# Patient Record
Sex: FEMALE | Race: WHITE | Employment: PART TIME | URBAN - METROPOLITAN AREA
[De-identification: names, ages, dates, MRNs, and addresses within clinical notes are randomized per-mention and may not be internally consistent; named-entity substitution may affect disease eponyms.]

---

## 2017-02-12 ENCOUNTER — PATIENT MESSAGE (OUTPATIENT)
Dept: FAMILY MEDICINE CLINIC | Facility: CLINIC | Age: 57
End: 2017-02-12

## 2017-04-24 ENCOUNTER — OFFICE VISIT (OUTPATIENT)
Dept: FAMILY MEDICINE CLINIC | Facility: CLINIC | Age: 57
End: 2017-04-24

## 2017-04-24 VITALS
HEIGHT: 58.5 IN | HEART RATE: 87 BPM | WEIGHT: 162.63 LBS | DIASTOLIC BLOOD PRESSURE: 84 MMHG | OXYGEN SATURATION: 98 % | TEMPERATURE: 99 F | BODY MASS INDEX: 33.23 KG/M2 | RESPIRATION RATE: 16 BRPM | SYSTOLIC BLOOD PRESSURE: 116 MMHG

## 2017-04-24 DIAGNOSIS — Z13.29 SCREENING FOR THYROID DISORDER: ICD-10-CM

## 2017-04-24 DIAGNOSIS — Z13.220 SCREENING, LIPID: ICD-10-CM

## 2017-04-24 DIAGNOSIS — I10 ESSENTIAL HYPERTENSION: Primary | ICD-10-CM

## 2017-04-24 DIAGNOSIS — Z12.31 ENCOUNTER FOR SCREENING MAMMOGRAM FOR BREAST CANCER: ICD-10-CM

## 2017-04-24 DIAGNOSIS — R63.5 WEIGHT GAIN: ICD-10-CM

## 2017-04-24 DIAGNOSIS — D72.819 LEUKOPENIA, UNSPECIFIED TYPE: ICD-10-CM

## 2017-04-24 DIAGNOSIS — Z13.0 SCREENING FOR DEFICIENCY ANEMIA: ICD-10-CM

## 2017-04-24 DIAGNOSIS — Z13.1 SCREENING FOR DIABETES MELLITUS: ICD-10-CM

## 2017-04-24 PROCEDURE — 99213 OFFICE O/P EST LOW 20 MIN: CPT | Performed by: FAMILY MEDICINE

## 2017-04-24 RX ORDER — TRIAMTERENE AND HYDROCHLOROTHIAZIDE 75; 50 MG/1; MG/1
TABLET ORAL
Qty: 45 TABLET | Refills: 1 | Status: CANCELLED | OUTPATIENT
Start: 2017-04-24

## 2017-04-24 RX ORDER — IRBESARTAN 150 MG/1
150 TABLET ORAL NIGHTLY
Qty: 30 TABLET | Refills: 1 | Status: SHIPPED | OUTPATIENT
Start: 2017-04-24 | End: 2017-07-11

## 2017-04-24 NOTE — PATIENT INSTRUCTIONS
STop the triamterene/HCTZ. Start irbesartan 150 mg daily, goal BP under 140/90. recheck blood pressure and physical in about a month, after labs.

## 2017-04-24 NOTE — PROGRESS NOTES
Nancy Donovan IS A 64year old female [facility] Patient presents with:  Medication Request: BP med refill  annual Px rescheduled        History of present illness:     BP occasionally has been higher when donating blood, no anemia, donates q 2 months. grandmother   • Hypertension Father    • Hypertension Other      grandmother   • Diabetes Other      grandmother, great aunt   • Asthma Father    • Asthma       grandmother   • Alcohol and Other Disorders Associated Mother    • Psychiatric Other for medication request.    Diagnoses and all orders for this visit:    Essential hypertension  -     Comp Metabolic Panel (14) [E]; Future  -     Irbesartan 150 MG Oral Tab; Take 1 tablet (150 mg total) by mouth nightly. Weight gain  -     TSH [E];  Futu

## 2017-05-02 ENCOUNTER — HOSPITAL ENCOUNTER (OUTPATIENT)
Dept: MAMMOGRAPHY | Age: 57
Discharge: HOME | End: 2017-05-02
Attending: FAMILY MEDICINE
Payer: COMMERCIAL

## 2017-05-02 DIAGNOSIS — Z12.31 ENCOUNTER FOR SCREENING MAMMOGRAM FOR BREAST CANCER: ICD-10-CM

## 2017-05-02 PROCEDURE — 77067 SCR MAMMO BI INCL CAD: CPT

## 2017-05-06 ENCOUNTER — LAB ENCOUNTER (OUTPATIENT)
Dept: LAB | Facility: HOSPITAL | Age: 57
End: 2017-05-06
Payer: COMMERCIAL

## 2017-05-06 DIAGNOSIS — D72.819 LEUKOPENIA, UNSPECIFIED TYPE: ICD-10-CM

## 2017-05-06 DIAGNOSIS — I10 ESSENTIAL HYPERTENSION: ICD-10-CM

## 2017-05-06 DIAGNOSIS — Z13.0 SCREENING FOR DEFICIENCY ANEMIA: ICD-10-CM

## 2017-05-06 DIAGNOSIS — Z13.29 SCREENING FOR THYROID DISORDER: ICD-10-CM

## 2017-05-06 DIAGNOSIS — R63.5 WEIGHT GAIN: ICD-10-CM

## 2017-05-06 DIAGNOSIS — Z13.1 SCREENING FOR DIABETES MELLITUS: ICD-10-CM

## 2017-05-06 DIAGNOSIS — Z13.220 SCREENING, LIPID: ICD-10-CM

## 2017-05-06 PROCEDURE — 85025 COMPLETE CBC W/AUTO DIFF WBC: CPT

## 2017-05-06 PROCEDURE — 80053 COMPREHEN METABOLIC PANEL: CPT

## 2017-05-06 PROCEDURE — 84443 ASSAY THYROID STIM HORMONE: CPT

## 2017-05-06 PROCEDURE — 36415 COLL VENOUS BLD VENIPUNCTURE: CPT

## 2017-05-06 PROCEDURE — 80061 LIPID PANEL: CPT

## 2017-05-06 PROCEDURE — 83036 HEMOGLOBIN GLYCOSYLATED A1C: CPT

## 2017-07-11 DIAGNOSIS — I10 ESSENTIAL HYPERTENSION: ICD-10-CM

## 2017-07-13 RX ORDER — IRBESARTAN 150 MG/1
TABLET ORAL
Qty: 30 TABLET | Refills: 0 | Status: SHIPPED | OUTPATIENT
Start: 2017-07-13 | End: 2017-09-03

## 2017-07-13 NOTE — TELEPHONE ENCOUNTER
Future Appointments  Date Time Provider Moi Fine   8/31/2017 1:30 PM Laura Henley MD [facility] [address]   12/4/2017 1:30 PM Radha Glass MD [facility]       LOV 4/17 Start irbesartan 150 mg daily, goal BP under 140/90.   recheck b

## 2017-08-31 ENCOUNTER — OFFICE VISIT (OUTPATIENT)
Dept: FAMILY MEDICINE CLINIC | Facility: CLINIC | Age: 57
End: 2017-08-31

## 2017-08-31 VITALS
RESPIRATION RATE: 16 BRPM | HEART RATE: 70 BPM | TEMPERATURE: 99 F | BODY MASS INDEX: 32.62 KG/M2 | WEIGHT: 159.63 LBS | SYSTOLIC BLOOD PRESSURE: 124 MMHG | DIASTOLIC BLOOD PRESSURE: 80 MMHG | HEIGHT: 58.5 IN

## 2017-08-31 DIAGNOSIS — Z12.11 SCREEN FOR COLON CANCER: Primary | ICD-10-CM

## 2017-08-31 DIAGNOSIS — M85.89 OSTEOPENIA OF MULTIPLE SITES: ICD-10-CM

## 2017-08-31 PROCEDURE — 99396 PREV VISIT EST AGE 40-64: CPT | Performed by: FAMILY MEDICINE

## 2017-08-31 RX ORDER — PHENOL 1.4 %
1 AEROSOL, SPRAY (ML) MUCOUS MEMBRANE DAILY
COMMUNITY

## 2017-08-31 NOTE — PATIENT INSTRUCTIONS
Pap recommended in 2019. We recommend exercise 150 minutes per week, combination of aerobic, strength and flexibility.  Diet: recommend low fat, high fiber with whole grains, fruits, vegetables, lean meats, chicken, fish, low-fat dairy if tolerated, limit s

## 2017-08-31 NOTE — PROGRESS NOTES
Patricio Serrano is a 64year old female here for Patient presents with:  Physical: Room 4. Unsure of pap       HPI:   Patient complains of here for well exam.    Older 2 grandchildren are senior & sophomore in [employer], youngest in 8th grade.      Some word-findi Rfl:    Calcium Carbonate Antacid (TUMS OR) Take 1 tablet by mouth daily. Disp:  Rfl:    IRBESARTAN 150 MG Oral Tab TAKE 1 TABLET(150 MG) BY MOUTH EVERY NIGHT Disp: 30 tablet Rfl: 0   GLUCOS-CHONDROIT-COLLAG-HYAL OR Take  by mouth.  Disp:  Rfl:        Jermain myalgias. Eye: No change in vision, no discharge, itching or dryness. ENT: No earache or change in hearing. No nasal congestion, allergies, sinus pain, nosebleed or sore throat.    Heme/lymph: No unusual bleeding or bruising, no lymph node enlargement or limits  EXTREMITIES: no cyanosis, clubbing or edema. Peripheral pulses normal.  NEURO: Nonfocal exam. Oriented times three,cranial nerves are intact,motor and sensory are grossly intact, speech normal, DTR's equal bilaterally.     Recent/past labs and consu MCHC 05/06/2017 32.8  31.0 - 37.0 g/dL Final   • RDW 05/06/2017 12.6  11.5 - 16.0 % Final   • RDW-SD 05/06/2017 42.4  35.1 - 46.3 fL Final   • Neutrophil Absolute Prelim 05/06/2017 1.86  1.30 - 6.70 x10 (3) uL Final   • Neutrophil Absolute 05/06/2017 1.86 return for CPX in 1 year.

## 2017-09-03 DIAGNOSIS — I10 ESSENTIAL HYPERTENSION: ICD-10-CM

## 2017-09-05 RX ORDER — IRBESARTAN 150 MG/1
TABLET ORAL
Qty: 90 TABLET | Refills: 1 | Status: SHIPPED | OUTPATIENT
Start: 2017-09-05 | End: 2017-11-20

## 2017-09-05 NOTE — TELEPHONE ENCOUNTER
Hypertension Medications Protocol Passed9/3 10:47 PM   CMP or BMP in past 12 months    Last serum creatinine< 2.0    Appointment in past 6 or next 3 months     Last refill 7/11/17 #30 only  First refill 4/24/17 #30 +1 refill  Patient Instructions   STop th

## 2017-09-09 ENCOUNTER — HOSPITAL ENCOUNTER (OUTPATIENT)
Dept: BONE DENSITY | Age: 57
Discharge: HOME | End: 2017-09-09
Attending: FAMILY MEDICINE
Payer: COMMERCIAL

## 2017-09-09 DIAGNOSIS — M85.89 OSTEOPENIA OF MULTIPLE SITES: ICD-10-CM

## 2017-09-09 PROCEDURE — 77080 DXA BONE DENSITY AXIAL: CPT | Performed by: FAMILY MEDICINE

## 2017-09-12 ENCOUNTER — APPOINTMENT (OUTPATIENT)
Dept: LAB | Facility: HOSPITAL | Age: 57
End: 2017-09-12
Attending: FAMILY MEDICINE
Payer: COMMERCIAL

## 2017-09-12 DIAGNOSIS — Z12.11 SCREEN FOR COLON CANCER: ICD-10-CM

## 2017-09-12 PROCEDURE — 82272 OCCULT BLD FECES 1-3 TESTS: CPT

## 2017-10-10 ENCOUNTER — IMMUNIZATION (OUTPATIENT)
Dept: FAMILY MEDICINE CLINIC | Facility: CLINIC | Age: 57
End: 2017-10-10

## 2017-10-10 DIAGNOSIS — Z23 NEED FOR VACCINATION: ICD-10-CM

## 2017-10-10 PROCEDURE — 90471 IMMUNIZATION ADMIN: CPT | Performed by: FAMILY MEDICINE

## 2017-10-10 PROCEDURE — 90686 IIV4 VACC NO PRSV 0.5 ML IM: CPT | Performed by: FAMILY MEDICINE

## 2017-11-20 ENCOUNTER — OFFICE VISIT (OUTPATIENT)
Dept: FAMILY MEDICINE CLINIC | Facility: CLINIC | Age: 57
End: 2017-11-20

## 2017-11-20 VITALS
HEIGHT: 58.5 IN | BODY MASS INDEX: 32.49 KG/M2 | TEMPERATURE: 99 F | HEART RATE: 92 BPM | SYSTOLIC BLOOD PRESSURE: 142 MMHG | WEIGHT: 159 LBS | DIASTOLIC BLOOD PRESSURE: 102 MMHG | OXYGEN SATURATION: 98 % | RESPIRATION RATE: 20 BRPM

## 2017-11-20 DIAGNOSIS — I10 ESSENTIAL HYPERTENSION: ICD-10-CM

## 2017-11-20 DIAGNOSIS — R07.9 CHEST PAIN, UNSPECIFIED TYPE: Primary | ICD-10-CM

## 2017-11-20 PROCEDURE — 99213 OFFICE O/P EST LOW 20 MIN: CPT | Performed by: FAMILY MEDICINE

## 2017-11-20 RX ORDER — IRBESARTAN 150 MG/1
TABLET ORAL
Qty: 90 TABLET | Refills: 1 | Status: SHIPPED | OUTPATIENT
Start: 2017-11-20 | End: 2018-06-27

## 2017-11-20 RX ORDER — HYDROCHLOROTHIAZIDE 12.5 MG/1
12.5 TABLET ORAL DAILY
Qty: 30 TABLET | Refills: 1 | Status: SHIPPED | OUTPATIENT
Start: 2017-11-20 | End: 2017-12-27

## 2017-11-20 NOTE — PATIENT INSTRUCTIONS
Stress test  Add HCTZ 12.5 mg to irbesartan, take HCTZ in morning and irbesartan at night. Recheck in 3 weeks with DR. Cuenca or Richard Woo.

## 2017-11-25 ENCOUNTER — APPOINTMENT (OUTPATIENT)
Dept: LAB | Facility: HOSPITAL | Age: 57
End: 2017-11-25
Attending: FAMILY MEDICINE
Payer: COMMERCIAL

## 2017-11-25 DIAGNOSIS — I10 ESSENTIAL HYPERTENSION: ICD-10-CM

## 2017-11-25 PROCEDURE — 80053 COMPREHEN METABOLIC PANEL: CPT

## 2017-11-25 PROCEDURE — 36415 COLL VENOUS BLD VENIPUNCTURE: CPT

## 2017-12-08 ENCOUNTER — HOSPITAL ENCOUNTER (OUTPATIENT)
Dept: CV DIAGNOSTICS | Facility: HOSPITAL | Age: 57
Discharge: HOME | End: 2017-12-08
Attending: FAMILY MEDICINE
Payer: COMMERCIAL

## 2017-12-08 DIAGNOSIS — I10 ESSENTIAL HYPERTENSION: ICD-10-CM

## 2017-12-08 DIAGNOSIS — R07.9 CHEST PAIN, UNSPECIFIED TYPE: ICD-10-CM

## 2017-12-08 PROCEDURE — 93350 STRESS TTE ONLY: CPT | Performed by: FAMILY MEDICINE

## 2017-12-08 PROCEDURE — 93018 CV STRESS TEST I&R ONLY: CPT | Performed by: FAMILY MEDICINE

## 2017-12-08 PROCEDURE — 93017 CV STRESS TEST TRACING ONLY: CPT | Performed by: FAMILY MEDICINE

## 2017-12-27 ENCOUNTER — OFFICE VISIT (OUTPATIENT)
Dept: FAMILY MEDICINE CLINIC | Facility: CLINIC | Age: 57
End: 2017-12-27

## 2017-12-27 VITALS
HEIGHT: 58.4 IN | OXYGEN SATURATION: 97 % | HEART RATE: 60 BPM | DIASTOLIC BLOOD PRESSURE: 82 MMHG | SYSTOLIC BLOOD PRESSURE: 122 MMHG | WEIGHT: 154.25 LBS | TEMPERATURE: 99 F | BODY MASS INDEX: 31.94 KG/M2 | RESPIRATION RATE: 16 BRPM

## 2017-12-27 DIAGNOSIS — Z11.59 NEED FOR HEPATITIS C SCREENING TEST: ICD-10-CM

## 2017-12-27 DIAGNOSIS — I10 ESSENTIAL HYPERTENSION: Primary | ICD-10-CM

## 2017-12-27 DIAGNOSIS — Z51.81 THERAPEUTIC DRUG MONITORING: ICD-10-CM

## 2017-12-27 PROCEDURE — 99214 OFFICE O/P EST MOD 30 MIN: CPT | Performed by: FAMILY MEDICINE

## 2017-12-27 RX ORDER — HYDROCHLOROTHIAZIDE 12.5 MG/1
12.5 TABLET ORAL DAILY
Qty: 90 TABLET | Refills: 1 | Status: SHIPPED | OUTPATIENT
Start: 2017-12-27 | End: 2018-02-06

## 2017-12-27 NOTE — PATIENT INSTRUCTIONS
Discharge Instructions for High Blood Pressure (Hypertension)  You have been diagnosed with high blood pressure (also called hypertension). This means the force of blood against your artery walls is too strong.  It also means your heart is working hard to · Begin an exercise program. Ask your doctor how to get started.  The American Heart Association recommends aerobic exercise 3 to 4 times a week for an average of 40 minutes at a time, with your doctor's approval. Simple activities like walking or gardening

## 2017-12-27 NOTE — PROGRESS NOTES
HPI:   Zeeshan Chang is a 62year old female that presents for hypertension follow-up. She takes irbesartan 150 mg daily. Hydrochlorothiazide was recently added to her regimen last month.   Blood pressure has been well controlled at home since then and rhythm, no murmurs, rubs or gallops. LUNGS: Clear to auscultation bilterally, no rales/rhonchi/wheezing. ABDOMEN:  Soft, nondistended, nontender, bowel sounds normal in all 4 quadrants, no masses, no hepatosplenomegaly.   EXTREMITIES:  No edema, no cyanos

## 2018-01-03 ENCOUNTER — TELEPHONE (OUTPATIENT)
Dept: FAMILY MEDICINE CLINIC | Facility: CLINIC | Age: 58
End: 2018-01-03

## 2018-01-03 ENCOUNTER — LAB ENCOUNTER (OUTPATIENT)
Dept: LAB | Facility: HOSPITAL | Age: 58
End: 2018-01-03
Attending: FAMILY MEDICINE
Payer: COMMERCIAL

## 2018-01-03 DIAGNOSIS — Z51.81 THERAPEUTIC DRUG MONITORING: ICD-10-CM

## 2018-01-03 DIAGNOSIS — T50.2X5A DIURETIC-INDUCED HYPOKALEMIA: Primary | ICD-10-CM

## 2018-01-03 DIAGNOSIS — E87.6 DIURETIC-INDUCED HYPOKALEMIA: Primary | ICD-10-CM

## 2018-01-03 DIAGNOSIS — I10 ESSENTIAL HYPERTENSION: ICD-10-CM

## 2018-01-03 DIAGNOSIS — Z11.59 NEED FOR HEPATITIS C SCREENING TEST: ICD-10-CM

## 2018-01-03 LAB
BUN BLD-MCNC: 11 MG/DL (ref 8–20)
CALCIUM BLD-MCNC: 9.6 MG/DL (ref 8.3–10.3)
CHLORIDE: 103 MMOL/L (ref 101–111)
CO2: 31 MMOL/L (ref 22–32)
CREAT BLD-MCNC: 0.69 MG/DL (ref 0.55–1.02)
GLUCOSE BLD-MCNC: 83 MG/DL (ref 70–99)
HEPATITIS C VIRUS AB INTERPRETATION: NONREACTIVE
POTASSIUM SERPL-SCNC: 3.4 MMOL/L (ref 3.6–5.1)
SODIUM SERPL-SCNC: 140 MMOL/L (ref 136–144)

## 2018-01-03 PROCEDURE — 36415 COLL VENOUS BLD VENIPUNCTURE: CPT

## 2018-01-03 PROCEDURE — 80048 BASIC METABOLIC PNL TOTAL CA: CPT

## 2018-01-03 PROCEDURE — 86803 HEPATITIS C AB TEST: CPT

## 2018-01-03 NOTE — TELEPHONE ENCOUNTER
----- Message from Bertha Odom DO sent at 1/3/2018  1:06 PM CST -----  Hypokalemia likely 2/2 new HCTZ, eat banana daily, recheck BMP in 2-4 weeks. If persists, would changes meds. Spoke to patient and gave information. Lab ordered.

## 2018-01-28 DIAGNOSIS — I10 ESSENTIAL HYPERTENSION: ICD-10-CM

## 2018-01-29 RX ORDER — HYDROCHLOROTHIAZIDE 12.5 MG/1
TABLET ORAL
Qty: 30 TABLET | Refills: 0 | OUTPATIENT
Start: 2018-01-29

## 2018-01-29 NOTE — TELEPHONE ENCOUNTER
Future Appointments  Date Time Provider Moi Rody   6/22/2018 9:30 AM Lani Moeller MD [facility]       LOV    LAST LAB    LAST RX  hydrochlorothiazide 12.5 MG Oral Tab 90 tablet 1 12/27/2017         PROTOCOL  Hypertension Medications

## 2018-01-31 ENCOUNTER — PATIENT MESSAGE (OUTPATIENT)
Dept: FAMILY MEDICINE CLINIC | Facility: CLINIC | Age: 58
End: 2018-01-31

## 2018-01-31 NOTE — TELEPHONE ENCOUNTER
From: Sarina Juares  To: Mariela Pemberton MD  Sent: 1/31/2018 10:40 AM CST  Subject: Prescription Question    Prescription refill was denied. Please advise. Praying you are well and had a restful, enjoyable recovery period. Hugs!     Holly Resendiz

## 2018-02-05 ENCOUNTER — PATIENT MESSAGE (OUTPATIENT)
Dept: FAMILY MEDICINE CLINIC | Facility: CLINIC | Age: 58
End: 2018-02-05

## 2018-02-05 DIAGNOSIS — I10 ESSENTIAL HYPERTENSION: ICD-10-CM

## 2018-02-06 RX ORDER — HYDROCHLOROTHIAZIDE 12.5 MG/1
12.5 TABLET ORAL DAILY
Qty: 90 TABLET | Refills: 1 | Status: SHIPPED | OUTPATIENT
Start: 2018-02-06 | End: 2018-08-19

## 2018-02-06 NOTE — TELEPHONE ENCOUNTER
From: Luis Jane  To: Jose D Jaffe MD  Sent: 2/5/2018 6:25 PM CST  Subject: Prescription Question    Good afternoon.     I checked with [facility] and instead of receiving a refill from your office, the system coded it as a denial. This seems to b

## 2018-03-15 ENCOUNTER — TELEPHONE (OUTPATIENT)
Dept: FAMILY MEDICINE CLINIC | Facility: CLINIC | Age: 58
End: 2018-03-15

## 2018-03-15 NOTE — TELEPHONE ENCOUNTER
Left detailed message for pt    Advised to seek UC if symptoms worsen    BMP was reordered by Dr Jordan Robertson due to low potasium last Jan    Notes Recorded by Laureen Fitzgerald DO on 1/3/2018 at 1:06 PM CST  Hypokalemia likely 2/2 new HCTZ, eat banana daily, re

## 2018-03-15 NOTE — TELEPHONE ENCOUNTER
Pt called and stated \"Lu sent her a [employer] message that she is overdue for Labs\". I did not see a TE so unsure how Dank Guzman knew to call the pt. After speaking with Jourdan Choudhary, I see a Lab Reminder letter was sent out.    (Under Letters tab)  Was unsure whe

## 2018-03-17 ENCOUNTER — PATIENT MESSAGE (OUTPATIENT)
Dept: FAMILY MEDICINE CLINIC | Facility: CLINIC | Age: 58
End: 2018-03-17

## 2018-03-17 ENCOUNTER — APPOINTMENT (OUTPATIENT)
Dept: LAB | Facility: HOSPITAL | Age: 58
End: 2018-03-17
Attending: FAMILY MEDICINE
Payer: COMMERCIAL

## 2018-03-17 DIAGNOSIS — T50.2X5A DIURETIC-INDUCED HYPOKALEMIA: ICD-10-CM

## 2018-03-17 DIAGNOSIS — E87.6 DIURETIC-INDUCED HYPOKALEMIA: ICD-10-CM

## 2018-03-17 LAB
BUN BLD-MCNC: 12 MG/DL (ref 8–20)
CALCIUM BLD-MCNC: 9.4 MG/DL (ref 8.3–10.3)
CHLORIDE: 103 MMOL/L (ref 101–111)
CO2: 32 MMOL/L (ref 22–32)
CREAT BLD-MCNC: 0.63 MG/DL (ref 0.55–1.02)
GLUCOSE BLD-MCNC: 85 MG/DL (ref 70–99)
POTASSIUM SERPL-SCNC: 3.7 MMOL/L (ref 3.6–5.1)
SODIUM SERPL-SCNC: 141 MMOL/L (ref 136–144)

## 2018-03-17 PROCEDURE — 80048 BASIC METABOLIC PNL TOTAL CA: CPT

## 2018-03-17 PROCEDURE — 36415 COLL VENOUS BLD VENIPUNCTURE: CPT

## 2018-03-19 NOTE — TELEPHONE ENCOUNTER
From: Krish Raines  To: Vance Palumbo MD  Sent: 3/17/2018 9:02 AM CDT  Subject: Test Results Question    Good morning.    I went to [employer] to retrieve the results from this morning's metabolic panel blood draw and saw a message from yesterday for y

## 2018-03-23 ENCOUNTER — TELEPHONE (OUTPATIENT)
Dept: FAMILY MEDICINE CLINIC | Facility: CLINIC | Age: 58
End: 2018-03-23

## 2018-03-23 NOTE — TELEPHONE ENCOUNTER
Requested an Appt. Abdominal pain x 8 weeks. Not constant. Eating really makes it hurt sometimes, also stretching. Got better, then flared up again. Declined 2:30 appt today. Goes into work at 2:00. OK to come in next week.   Declined suggestion

## 2018-03-24 NOTE — TELEPHONE ENCOUNTER
Spoke to Pt Saturday. Scheduled Appt.   Future Appointments  Date Time Provider Moi Rody   3/26/2018 11:00 AM Adriane Lujan MD [facility]   4/9/2018 5:45 PM Dangelo Garcia MD [facility]   6/22/2018 9:30 AM Dangelo Garcia,

## 2018-03-26 ENCOUNTER — OFFICE VISIT (OUTPATIENT)
Dept: FAMILY MEDICINE CLINIC | Facility: CLINIC | Age: 58
End: 2018-03-26

## 2018-03-26 VITALS
TEMPERATURE: 99 F | HEIGHT: 58.5 IN | WEIGHT: 153 LBS | RESPIRATION RATE: 16 BRPM | SYSTOLIC BLOOD PRESSURE: 148 MMHG | HEART RATE: 75 BPM | OXYGEN SATURATION: 96 % | BODY MASS INDEX: 31.26 KG/M2 | DIASTOLIC BLOOD PRESSURE: 86 MMHG

## 2018-03-26 DIAGNOSIS — R10.11 RUQ ABDOMINAL PAIN: Primary | ICD-10-CM

## 2018-03-26 PROCEDURE — 99213 OFFICE O/P EST LOW 20 MIN: CPT | Performed by: FAMILY MEDICINE

## 2018-03-26 NOTE — PROGRESS NOTES
Selin Hilton IS A 62year old female 149 [facility] Patient presents with:  Abdominal Pain: RUQ       History of present illness:     Long-time friend with severe DM, amputee,  suddenly today after resuscitation attempt with collapse yesterday.  Cousin die (MULTIVITAMIN ADULTS 50+ OR) Take 1 tablet by mouth daily. Disp:  Rfl:    IRON OR Take 1 tablet by mouth daily. Disp:  Rfl:    Calcium Carbonate Antacid (TUMS OR) Take 1 tablet by mouth daily. Disp:  Rfl:    GLUCOS-CHONDROIT-COLLAG-HYAL OR Take  by mouth. months after shingles 2 years ago, then resolved. That pain seemed more stabbing pains that went inside. The current pain is different. Tried pizza last night and was more severe.      Occasional sharp pain or pressure anteriorly, or \"iron band\" aroun

## 2018-03-26 NOTE — PATIENT INSTRUCTIONS
Recommend ultrasound promptly, lower fat diet. If ultrasound negative for gallbladder disease, consider gabapentin or other medication for shingles pain.

## 2018-03-27 ENCOUNTER — HOSPITAL ENCOUNTER (OUTPATIENT)
Dept: ULTRASOUND IMAGING | Facility: HOSPITAL | Age: 58
Discharge: HOME | End: 2018-03-27
Attending: FAMILY MEDICINE
Payer: COMMERCIAL

## 2018-03-27 DIAGNOSIS — K83.8 COMMON BILE DUCT DILATATION: ICD-10-CM

## 2018-03-27 DIAGNOSIS — R10.11 RUQ ABDOMINAL PAIN: ICD-10-CM

## 2018-03-27 DIAGNOSIS — R10.11 PAIN, ABDOMINAL, RUQ: Primary | ICD-10-CM

## 2018-03-27 PROCEDURE — 76700 US EXAM ABDOM COMPLETE: CPT | Performed by: FAMILY MEDICINE

## 2018-03-27 NOTE — PROGRESS NOTES
Pt notified of test results, still had pain after eating today. Will order HIDA scan, described it to her, await preauth.

## 2018-06-27 DIAGNOSIS — I10 ESSENTIAL HYPERTENSION: ICD-10-CM

## 2018-06-28 RX ORDER — IRBESARTAN 150 MG/1
TABLET ORAL
Qty: 90 TABLET | Refills: 0 | Status: SHIPPED | OUTPATIENT
Start: 2018-06-28 | End: 2018-08-23

## 2018-06-28 NOTE — TELEPHONE ENCOUNTER
LOV 3/18    LAST LAB 3/18    LAST RX   Irbesartan 150 MG Oral Tab 90 tablet 1 11/20/2017         Next OV Visit date not found      PROTOCOL  Hypertension Medications Protocol Passed    Refilled x 3 months. Next refill at appt.

## 2018-08-19 DIAGNOSIS — I10 ESSENTIAL HYPERTENSION: ICD-10-CM

## 2018-08-21 RX ORDER — HYDROCHLOROTHIAZIDE 12.5 MG/1
TABLET ORAL
Qty: 30 TABLET | Refills: 0 | Status: SHIPPED | OUTPATIENT
Start: 2018-08-21 | End: 2018-08-23

## 2018-08-21 NOTE — TELEPHONE ENCOUNTER
HYDROCHLOROTHIAZIDE 12.5MG TABLETS  Will file in chart as: HYDROCHLOROTHIAZIDE 12.5 MG Oral Tab  TAKE 1 TABLET(12.5 MG) BY MOUTH DAILY       Disp: 90 tablet Refills: 0    Class: Normal Start: 8/19/2018   For: Essential hypertension  Originally ordered: 9 m

## 2018-08-23 ENCOUNTER — OFFICE VISIT (OUTPATIENT)
Dept: FAMILY MEDICINE CLINIC | Facility: CLINIC | Age: 58
End: 2018-08-23
Payer: COMMERCIAL

## 2018-08-23 VITALS
SYSTOLIC BLOOD PRESSURE: 128 MMHG | DIASTOLIC BLOOD PRESSURE: 84 MMHG | HEART RATE: 60 BPM | HEIGHT: 58 IN | RESPIRATION RATE: 16 BRPM | TEMPERATURE: 99 F | WEIGHT: 151.25 LBS | BODY MASS INDEX: 31.75 KG/M2

## 2018-08-23 DIAGNOSIS — M17.0 PRIMARY OSTEOARTHRITIS OF BOTH KNEES: ICD-10-CM

## 2018-08-23 DIAGNOSIS — G89.29 CHRONIC LEFT-SIDED LOW BACK PAIN WITH LEFT-SIDED SCIATICA: ICD-10-CM

## 2018-08-23 DIAGNOSIS — M51.37 DEGENERATION OF INTERVERTEBRAL DISC AT L5-S1 LEVEL: ICD-10-CM

## 2018-08-23 DIAGNOSIS — R10.11 RUQ ABDOMINAL PAIN: ICD-10-CM

## 2018-08-23 DIAGNOSIS — M54.42 CHRONIC LEFT-SIDED LOW BACK PAIN WITH LEFT-SIDED SCIATICA: ICD-10-CM

## 2018-08-23 DIAGNOSIS — I10 ESSENTIAL HYPERTENSION: Primary | ICD-10-CM

## 2018-08-23 PROBLEM — M51.379 DEGENERATION OF INTERVERTEBRAL DISC AT L5-S1 LEVEL: Status: ACTIVE | Noted: 2018-08-23

## 2018-08-23 PROCEDURE — 99213 OFFICE O/P EST LOW 20 MIN: CPT | Performed by: FAMILY MEDICINE

## 2018-08-23 RX ORDER — IRBESARTAN 150 MG/1
TABLET ORAL
Qty: 90 TABLET | Refills: 1 | Status: SHIPPED | OUTPATIENT
Start: 2018-08-23 | End: 2019-05-14

## 2018-08-23 RX ORDER — ARIPIPRAZOLE 15 MG/1
20 TABLET ORAL DAILY
COMMUNITY
End: 2018-08-23

## 2018-08-23 RX ORDER — HYDROCHLOROTHIAZIDE 12.5 MG/1
TABLET ORAL
Qty: 90 TABLET | Refills: 1 | Status: SHIPPED | OUTPATIENT
Start: 2018-08-23 | End: 2019-06-06

## 2018-08-23 NOTE — PATIENT INSTRUCTIONS
Check kidney & electrolyte function on labs. Continue current BP meds. Gallbladder function test to see if you have sluggish gallbladder function.

## 2018-08-23 NOTE — PROGRESS NOTES
Zeeshan Chang IS A 62year old female 149 [facility] Patient presents with:  Hypertension: Medication Refill       History of present illness:     Was more faithful recording food intake and added OTC K. Aiming for 6-7k steps/day in 60 minutes.  Knee pain a Allergy:      No Known Allergies    Family history:       Family History   Problem Relation Age of Onset   • Breast Cancer Maternal Cousin Female    • Cancer Other      GI ca-liver, grandfather, great aunt   • Heart Disease Other      CAD, grandmothe Resp 16   Ht 58\"   Wt 151 lb 4 oz   BMI 31.61 kg/m²      Wt down 2#  LUngs clear   Heart regular rhythm no S3 s4 murmur  Abdomen soft RUQ tender  Extremities pulses normal no edema. Test results:   Last CMP normal. K 3.7.      Assessment & Plan:   Ter

## 2018-08-28 ENCOUNTER — APPOINTMENT (OUTPATIENT)
Dept: LAB | Facility: HOSPITAL | Age: 58
End: 2018-08-28
Attending: FAMILY MEDICINE
Payer: COMMERCIAL

## 2018-08-28 DIAGNOSIS — I10 ESSENTIAL HYPERTENSION: ICD-10-CM

## 2018-08-28 LAB
ANION GAP SERPL CALC-SCNC: 4 MMOL/L (ref 0–18)
BUN BLD-MCNC: 18 MG/DL (ref 8–20)
BUN/CREAT SERPL: 19.6 (ref 10–20)
CALCIUM BLD-MCNC: 9.6 MG/DL (ref 8.3–10.3)
CHLORIDE SERPL-SCNC: 104 MMOL/L (ref 101–111)
CO2 SERPL-SCNC: 31 MMOL/L (ref 22–32)
CREAT BLD-MCNC: 0.92 MG/DL (ref 0.55–1.02)
GLUCOSE BLD-MCNC: 87 MG/DL (ref 70–99)
OSMOLALITY SERPL CALC.SUM OF ELEC: 289 MOSM/KG (ref 275–295)
POTASSIUM SERPL-SCNC: 3.8 MMOL/L (ref 3.6–5.1)
SODIUM SERPL-SCNC: 139 MMOL/L (ref 136–144)

## 2018-08-28 PROCEDURE — 80048 BASIC METABOLIC PNL TOTAL CA: CPT

## 2018-08-28 PROCEDURE — 36415 COLL VENOUS BLD VENIPUNCTURE: CPT

## 2019-01-02 ENCOUNTER — IMMUNIZATION (OUTPATIENT)
Dept: FAMILY MEDICINE CLINIC | Facility: CLINIC | Age: 59
End: 2019-01-02
Payer: COMMERCIAL

## 2019-01-02 DIAGNOSIS — Z23 NEED FOR VACCINATION: ICD-10-CM

## 2019-01-02 PROCEDURE — 90471 IMMUNIZATION ADMIN: CPT | Performed by: FAMILY MEDICINE

## 2019-01-02 PROCEDURE — 90686 IIV4 VACC NO PRSV 0.5 ML IM: CPT | Performed by: FAMILY MEDICINE

## 2019-05-14 DIAGNOSIS — I10 ESSENTIAL HYPERTENSION: ICD-10-CM

## 2019-05-14 RX ORDER — IRBESARTAN 150 MG/1
TABLET ORAL
Qty: 30 TABLET | Refills: 0 | Status: SHIPPED | OUTPATIENT
Start: 2019-05-14 | End: 2019-06-06

## 2019-05-14 NOTE — TELEPHONE ENCOUNTER
Name from pharmacy: IRBESARTAN 150MG TABLETS          Will file in chart as: IRBESARTAN 150 MG Oral Tab    Sig: TAKE ONE TABLET BY MOUTH EVERY NIGHT    Disp:  90 tablet    Refills:  0    Start: 5/14/2019    Class: Normal    For: Essential hypertension    R

## 2019-06-02 DIAGNOSIS — I10 ESSENTIAL HYPERTENSION: ICD-10-CM

## 2019-06-03 RX ORDER — HYDROCHLOROTHIAZIDE 12.5 MG/1
TABLET ORAL
Qty: 90 TABLET | Refills: 0 | OUTPATIENT
Start: 2019-06-03

## 2019-06-03 NOTE — TELEPHONE ENCOUNTER
Name from pharmacy: HYDROCHLOROTHIAZIDE 12.5MG TABLETS          Will file in chart as: HYDROCHLOROTHIAZIDE 12.5 MG Oral Tab    Sig: TAKE ONE TABLET BY MOUTH EVERY DAY    Disp:  90 tablet    Refills:  0    Start: 6/2/2019    Class: Normal    For: Essential

## 2019-06-06 ENCOUNTER — OFFICE VISIT (OUTPATIENT)
Dept: FAMILY MEDICINE CLINIC | Facility: CLINIC | Age: 59
End: 2019-06-06
Payer: COMMERCIAL

## 2019-06-06 VITALS
WEIGHT: 150.38 LBS | TEMPERATURE: 99 F | SYSTOLIC BLOOD PRESSURE: 128 MMHG | HEIGHT: 58.5 IN | RESPIRATION RATE: 15 BRPM | DIASTOLIC BLOOD PRESSURE: 86 MMHG | BODY MASS INDEX: 30.72 KG/M2 | HEART RATE: 70 BPM | OXYGEN SATURATION: 98 %

## 2019-06-06 DIAGNOSIS — Z12.12 ENCOUNTER FOR COLORECTAL CANCER SCREENING: Primary | ICD-10-CM

## 2019-06-06 DIAGNOSIS — M81.8 OTHER OSTEOPOROSIS WITHOUT CURRENT PATHOLOGICAL FRACTURE: ICD-10-CM

## 2019-06-06 DIAGNOSIS — I10 ESSENTIAL HYPERTENSION: ICD-10-CM

## 2019-06-06 DIAGNOSIS — Z78.0 POSTMENOPAUSAL: ICD-10-CM

## 2019-06-06 DIAGNOSIS — Z12.11 ENCOUNTER FOR COLORECTAL CANCER SCREENING: Primary | ICD-10-CM

## 2019-06-06 PROCEDURE — 99214 OFFICE O/P EST MOD 30 MIN: CPT | Performed by: FAMILY MEDICINE

## 2019-06-06 RX ORDER — HYDROCHLOROTHIAZIDE 12.5 MG/1
TABLET ORAL
Qty: 90 TABLET | Refills: 1 | Status: SHIPPED | OUTPATIENT
Start: 2019-06-06 | End: 2019-12-21

## 2019-06-06 RX ORDER — IRBESARTAN 150 MG/1
TABLET ORAL
Qty: 90 TABLET | Refills: 1 | Status: SHIPPED | OUTPATIENT
Start: 2019-06-06 | End: 2019-12-21

## 2019-06-06 NOTE — PATIENT INSTRUCTIONS
Continue same meds. Refilled for 6 months. Schedule physical appointment     Testing after 9/9/19 for DEXA & mammogram, test for blood in stool soon. due lab work soon for sugar liver & kidney.

## 2019-06-06 NOTE — PROGRESS NOTES
Asif Cotton IS A 62year old female [address] [place] Patient presents with:  HTN: Med refills and F/U        History of present illness:     Careful with sun exposure, had bad sunburn with triamterene.      Sees derm q 6 months, has had squamous cell ca and pre Carbonate Antacid (TUMS OR) Take 1 tablet by mouth daily. Disp:  Rfl:    GLUCOS-CHONDROIT-COLLAG-HYAL OR Take  by mouth.  Disp:  Rfl:        Allergy:      No Known Allergies    Family history:       Family History   Problem Relation Age of Onset   • Breast Talks on phone: Not on file        Gets together: Not on file        Attends Synagogue service: Not on file        Active member of club or organization: Not on file        Attends meetings of clubs or organizations: Not on file        Relationship status: MG) BY MOUTH DAILY  -     COMP METABOLIC PANEL (14); Future    Postmenopausal  -     KEVEN SCREENING BILAT (CPT=09654); Future  -     XR DEXA BONE DENSITOMETRY (CPT=77015);  Future    Other osteoporosis without current pathological fracture  -     COMP METABO

## 2019-06-07 PROCEDURE — 82274 ASSAY TEST FOR BLOOD FECAL: CPT

## 2019-06-13 ENCOUNTER — APPOINTMENT (OUTPATIENT)
Dept: LAB | Age: 59
End: 2019-06-13
Attending: FAMILY MEDICINE
Payer: COMMERCIAL

## 2019-06-13 DIAGNOSIS — Z12.11 ENCOUNTER FOR COLORECTAL CANCER SCREENING: ICD-10-CM

## 2019-06-13 DIAGNOSIS — Z12.12 ENCOUNTER FOR COLORECTAL CANCER SCREENING: ICD-10-CM

## 2019-06-15 ENCOUNTER — APPOINTMENT (OUTPATIENT)
Dept: LAB | Facility: HOSPITAL | Age: 59
End: 2019-06-15
Attending: FAMILY MEDICINE
Payer: COMMERCIAL

## 2019-06-15 DIAGNOSIS — M81.8 OTHER OSTEOPOROSIS WITHOUT CURRENT PATHOLOGICAL FRACTURE: ICD-10-CM

## 2019-06-15 DIAGNOSIS — I10 ESSENTIAL HYPERTENSION: ICD-10-CM

## 2019-06-15 PROCEDURE — 36415 COLL VENOUS BLD VENIPUNCTURE: CPT

## 2019-06-15 PROCEDURE — 80053 COMPREHEN METABOLIC PANEL: CPT

## 2019-09-14 ENCOUNTER — HOSPITAL ENCOUNTER (OUTPATIENT)
Dept: MAMMOGRAPHY | Age: 59
Discharge: HOME | End: 2019-09-14
Attending: FAMILY MEDICINE
Payer: COMMERCIAL

## 2019-09-14 ENCOUNTER — HOSPITAL ENCOUNTER (OUTPATIENT)
Dept: BONE DENSITY | Age: 59
Discharge: HOME | End: 2019-09-14
Attending: FAMILY MEDICINE
Payer: COMMERCIAL

## 2019-09-14 DIAGNOSIS — M81.8 OTHER OSTEOPOROSIS WITHOUT CURRENT PATHOLOGICAL FRACTURE: ICD-10-CM

## 2019-09-14 DIAGNOSIS — Z78.0 POSTMENOPAUSAL: ICD-10-CM

## 2019-09-14 PROCEDURE — 77080 DXA BONE DENSITY AXIAL: CPT | Performed by: FAMILY MEDICINE

## 2019-09-14 PROCEDURE — 77067 SCR MAMMO BI INCL CAD: CPT | Performed by: FAMILY MEDICINE

## 2019-09-14 PROCEDURE — 77063 BREAST TOMOSYNTHESIS BI: CPT | Performed by: FAMILY MEDICINE

## 2019-09-20 ENCOUNTER — OFFICE VISIT (OUTPATIENT)
Dept: FAMILY MEDICINE CLINIC | Facility: CLINIC | Age: 59
End: 2019-09-20
Payer: COMMERCIAL

## 2019-09-20 VITALS
TEMPERATURE: 99 F | DIASTOLIC BLOOD PRESSURE: 74 MMHG | WEIGHT: 150 LBS | BODY MASS INDEX: 30.65 KG/M2 | HEIGHT: 58.47 IN | HEART RATE: 77 BPM | SYSTOLIC BLOOD PRESSURE: 132 MMHG | OXYGEN SATURATION: 99 % | RESPIRATION RATE: 16 BRPM

## 2019-09-20 DIAGNOSIS — Z23 NEED FOR VACCINATION: ICD-10-CM

## 2019-09-20 DIAGNOSIS — Z13.220 SCREENING, LIPID: ICD-10-CM

## 2019-09-20 DIAGNOSIS — Z01.419 WELL WOMAN EXAM WITH ROUTINE GYNECOLOGICAL EXAM: Primary | ICD-10-CM

## 2019-09-20 DIAGNOSIS — Z23 NEED FOR TDAP VACCINATION: ICD-10-CM

## 2019-09-20 DIAGNOSIS — Z13.1 SCREENING FOR DIABETES MELLITUS: ICD-10-CM

## 2019-09-20 DIAGNOSIS — Z13.0 SCREENING FOR DEFICIENCY ANEMIA: ICD-10-CM

## 2019-09-20 DIAGNOSIS — Z12.11 ENCOUNTER FOR COLORECTAL CANCER SCREENING: ICD-10-CM

## 2019-09-20 DIAGNOSIS — Z12.12 ENCOUNTER FOR COLORECTAL CANCER SCREENING: ICD-10-CM

## 2019-09-20 DIAGNOSIS — Z13.29 SCREENING FOR THYROID DISORDER: ICD-10-CM

## 2019-09-20 DIAGNOSIS — Z12.31 ENCOUNTER FOR SCREENING MAMMOGRAM FOR BREAST CANCER: ICD-10-CM

## 2019-09-20 DIAGNOSIS — Z78.0 POSTMENOPAUSAL: ICD-10-CM

## 2019-09-20 PROBLEM — M81.0 AGE-RELATED OSTEOPOROSIS WITHOUT CURRENT PATHOLOGICAL FRACTURE: Status: ACTIVE | Noted: 2019-09-20

## 2019-09-20 PROCEDURE — 87624 HPV HI-RISK TYP POOLED RSLT: CPT | Performed by: FAMILY MEDICINE

## 2019-09-20 PROCEDURE — 88175 CYTOPATH C/V AUTO FLUID REDO: CPT | Performed by: FAMILY MEDICINE

## 2019-09-20 PROCEDURE — 90471 IMMUNIZATION ADMIN: CPT | Performed by: FAMILY MEDICINE

## 2019-09-20 PROCEDURE — 90686 IIV4 VACC NO PRSV 0.5 ML IM: CPT | Performed by: FAMILY MEDICINE

## 2019-09-20 PROCEDURE — 90714 TD VACC NO PRESV 7 YRS+ IM: CPT | Performed by: FAMILY MEDICINE

## 2019-09-20 PROCEDURE — 99396 PREV VISIT EST AGE 40-64: CPT | Performed by: FAMILY MEDICINE

## 2019-09-20 PROCEDURE — 90472 IMMUNIZATION ADMIN EACH ADD: CPT | Performed by: FAMILY MEDICINE

## 2019-09-20 NOTE — PROGRESS NOTES
Genoveva Boykin is a 61year old female here for Patient presents with:  Physical: mammo & Dexa done 9/14/19 --2/5/2016 last pap w/ HPV WNL neg HPV  Imm/Inj: Pt is requesting flu shot      HPI:   Patient complains of here for well exam.     Hearing problem daily. Disp:  Rfl:    IRON OR Take 1 tablet by mouth daily. Disp:  Rfl:    Calcium Carbonate Antacid (TUMS OR) Take 1 tablet by mouth daily. Disp:  Rfl:    GLUCOS-CHONDROIT-COLLAG-HYAL OR Take  by mouth.  Disp:  Rfl:        Allergy:  No Known Allergies    F Stress: Not on file    Relationships      Social connections:        Talks on phone: Not on file        Gets together: Not on file        Attends Latter day service: Not on file        Active member of club or organization: Not on file        Attends meetin nose patent, throat normal without inflammation. NECK: supple,no adenopathy, no thyromegaly. CHEST: no chest wall tenderness. BREASTS: Texture normal, no dominant or suspicious mass, no nipple inversion or discharge, no axillary adenopathy.   LUNGS: jeffrey mg/dL Final   • Total Protein 06/15/2019 7.1  6.4 - 8.2 g/dL Final   • Albumin 06/15/2019 4.0  3.4 - 5.0 g/dL Final   • Globulin  06/15/2019 3.1  2.8 - 4.4 g/dL Final   • A/G Ratio 06/15/2019 1.3  1.0 - 2.0 Final         ASSESSMENT AND PLAN:   Nilda Knott was s

## 2019-09-21 ENCOUNTER — LAB ENCOUNTER (OUTPATIENT)
Dept: LAB | Facility: HOSPITAL | Age: 59
End: 2019-09-21
Attending: FAMILY MEDICINE
Payer: COMMERCIAL

## 2019-09-21 DIAGNOSIS — Z13.29 SCREENING FOR THYROID DISORDER: ICD-10-CM

## 2019-09-21 DIAGNOSIS — Z13.220 SCREENING, LIPID: ICD-10-CM

## 2019-09-21 DIAGNOSIS — Z13.1 SCREENING FOR DIABETES MELLITUS: ICD-10-CM

## 2019-09-21 DIAGNOSIS — Z13.0 SCREENING FOR DEFICIENCY ANEMIA: ICD-10-CM

## 2019-09-21 LAB
ALBUMIN SERPL-MCNC: 4.2 G/DL (ref 3.4–5)
ALBUMIN/GLOB SERPL: 1.3 {RATIO} (ref 1–2)
ALP LIVER SERPL-CCNC: 77 U/L (ref 46–118)
ALT SERPL-CCNC: 21 U/L (ref 13–56)
ANION GAP SERPL CALC-SCNC: 6 MMOL/L (ref 0–18)
AST SERPL-CCNC: 15 U/L (ref 15–37)
BASOPHILS # BLD AUTO: 0.04 X10(3) UL (ref 0–0.2)
BASOPHILS NFR BLD AUTO: 1 %
BILIRUB SERPL-MCNC: 0.6 MG/DL (ref 0.1–2)
BUN BLD-MCNC: 12 MG/DL (ref 7–18)
BUN/CREAT SERPL: 16.4 (ref 10–20)
CALCIUM BLD-MCNC: 9.2 MG/DL (ref 8.5–10.1)
CHLORIDE SERPL-SCNC: 105 MMOL/L (ref 98–112)
CHOLEST SMN-MCNC: 217 MG/DL (ref ?–200)
CO2 SERPL-SCNC: 29 MMOL/L (ref 21–32)
CREAT BLD-MCNC: 0.73 MG/DL (ref 0.55–1.02)
DEPRECATED RDW RBC AUTO: 42.3 FL (ref 35.1–46.3)
EOSINOPHIL # BLD AUTO: 0.07 X10(3) UL (ref 0–0.7)
EOSINOPHIL NFR BLD AUTO: 1.8 %
ERYTHROCYTE [DISTWIDTH] IN BLOOD BY AUTOMATED COUNT: 12.8 % (ref 11–15)
GLOBULIN PLAS-MCNC: 3.2 G/DL (ref 2.8–4.4)
GLUCOSE BLD-MCNC: 87 MG/DL (ref 70–99)
HCT VFR BLD AUTO: 38.3 % (ref 35–48)
HDLC SERPL-MCNC: 78 MG/DL (ref 40–59)
HGB BLD-MCNC: 12.5 G/DL (ref 12–16)
IMM GRANULOCYTES # BLD AUTO: 0 X10(3) UL (ref 0–1)
IMM GRANULOCYTES NFR BLD: 0 %
LDLC SERPL CALC-MCNC: 120 MG/DL (ref ?–100)
LYMPHOCYTES # BLD AUTO: 1.1 X10(3) UL (ref 1–4)
LYMPHOCYTES NFR BLD AUTO: 28.2 %
M PROTEIN MFR SERPL ELPH: 7.4 G/DL (ref 6.4–8.2)
MCH RBC QN AUTO: 29.7 PG (ref 26–34)
MCHC RBC AUTO-ENTMCNC: 32.6 G/DL (ref 31–37)
MCV RBC AUTO: 91 FL (ref 80–100)
MONOCYTES # BLD AUTO: 0.4 X10(3) UL (ref 0.1–1)
MONOCYTES NFR BLD AUTO: 10.3 %
NEUTROPHILS # BLD AUTO: 2.29 X10 (3) UL (ref 1.5–7.7)
NEUTROPHILS # BLD AUTO: 2.29 X10(3) UL (ref 1.5–7.7)
NEUTROPHILS NFR BLD AUTO: 58.7 %
NONHDLC SERPL-MCNC: 139 MG/DL (ref ?–130)
OSMOLALITY SERPL CALC.SUM OF ELEC: 289 MOSM/KG (ref 275–295)
PLATELET # BLD AUTO: 273 10(3)UL (ref 150–450)
POTASSIUM SERPL-SCNC: 3.7 MMOL/L (ref 3.5–5.1)
RBC # BLD AUTO: 4.21 X10(6)UL (ref 3.8–5.3)
SODIUM SERPL-SCNC: 140 MMOL/L (ref 136–145)
T4 FREE SERPL-MCNC: 0.9 NG/DL (ref 0.8–1.7)
TRIGL SERPL-MCNC: 97 MG/DL (ref 30–149)
TSI SER-ACNC: 3.4 MIU/ML (ref 0.36–3.74)
VLDLC SERPL CALC-MCNC: 19 MG/DL (ref 0–30)
WBC # BLD AUTO: 3.9 X10(3) UL (ref 4–11)

## 2019-09-21 PROCEDURE — 84439 ASSAY OF FREE THYROXINE: CPT

## 2019-09-21 PROCEDURE — 80053 COMPREHEN METABOLIC PANEL: CPT

## 2019-09-21 PROCEDURE — 85025 COMPLETE CBC W/AUTO DIFF WBC: CPT

## 2019-09-21 PROCEDURE — 84443 ASSAY THYROID STIM HORMONE: CPT

## 2019-09-21 PROCEDURE — 36415 COLL VENOUS BLD VENIPUNCTURE: CPT

## 2019-09-21 PROCEDURE — 80061 LIPID PANEL: CPT

## 2019-09-22 LAB — HPV I/H RISK 1 DNA SPEC QL NAA+PROBE: NEGATIVE

## 2019-09-25 LAB
LAST PAP RESULT: NORMAL
PAP HISTORY (OTHER THAN LAST PAP): NORMAL

## 2019-09-30 LAB
AMB EXT CHOLESTEROL, TOTAL: 159 MG/DL
AMB EXT CREATININE: 0.89 MG/DL
AMB EXT GLUCOSE: 93 MG/DL
AMB EXT HDL CHOLESTEROL: 70 MG/DL
AMB EXT HEMATOCRIT: 38.6
AMB EXT HEMOGLOBIN: 13.1
AMB EXT LDL CHOLESTEROL, DIRECT: 76 MG/DL
AMB EXT MCV: 88
AMB EXT PLATELETS: 235
AMB EXT TRIGLYCERIDES: 64 MG/DL
AMB EXT WBC: 5 X10(3)UL

## 2019-12-18 ENCOUNTER — PATIENT MESSAGE (OUTPATIENT)
Dept: FAMILY MEDICINE CLINIC | Facility: CLINIC | Age: 59
End: 2019-12-18

## 2019-12-19 NOTE — TELEPHONE ENCOUNTER
From: Randal Castañeda  To: Teresa Brewster MD  Sent: 12/18/2019 9:34 PM CST  Subject: Other    Please cancel 4:30 appointment for [facility] as we went to [facility] instead. Thank you.    Linwood Cota

## 2019-12-21 ENCOUNTER — OFFICE VISIT (OUTPATIENT)
Dept: FAMILY MEDICINE CLINIC | Facility: CLINIC | Age: 59
End: 2019-12-21
Payer: COMMERCIAL

## 2019-12-21 VITALS
WEIGHT: 151 LBS | OXYGEN SATURATION: 98 % | DIASTOLIC BLOOD PRESSURE: 70 MMHG | RESPIRATION RATE: 16 BRPM | HEART RATE: 65 BPM | HEIGHT: 58.5 IN | SYSTOLIC BLOOD PRESSURE: 132 MMHG | TEMPERATURE: 97 F | BODY MASS INDEX: 30.85 KG/M2

## 2019-12-21 DIAGNOSIS — I10 ESSENTIAL HYPERTENSION: ICD-10-CM

## 2019-12-21 DIAGNOSIS — G89.29 CHRONIC RUQ PAIN: Primary | ICD-10-CM

## 2019-12-21 DIAGNOSIS — R10.11 CHRONIC RUQ PAIN: Primary | ICD-10-CM

## 2019-12-21 PROCEDURE — 99213 OFFICE O/P EST LOW 20 MIN: CPT | Performed by: FAMILY MEDICINE

## 2019-12-21 RX ORDER — IRBESARTAN 150 MG/1
TABLET ORAL
Qty: 90 TABLET | Refills: 1 | Status: SHIPPED | OUTPATIENT
Start: 2019-12-21 | End: 2020-07-02

## 2019-12-21 RX ORDER — HYDROCHLOROTHIAZIDE 12.5 MG/1
TABLET ORAL
Qty: 90 TABLET | Refills: 1 | Status: SHIPPED | OUTPATIENT
Start: 2019-12-21 | End: 2020-06-22

## 2019-12-21 NOTE — PROGRESS NOTES
Randal Castañeda IS A 61year old female [address] [place] Patient presents with:  Medication Follow-Up: 6 months follow up. History of present illness:     Not tracking BP, donated blood last week and BP was 130s. No headache, chest pain, dyspnea or edema. Calcium Carbonate Antacid (TUMS OR) Take 1 tablet by mouth daily. • GLUCOS-CHONDROIT-COLLAG-HYAL OR Take  by mouth.          Allergy:      No Known Allergies    Family history:       Family History   Problem Relation Age of Onset   • Breast Cancer Mater Talks on phone: Not on file        Gets together: Not on file        Attends Synagogue service: Not on file        Active member of club or organization: Not on file        Attends meetings of clubs or organizations: Not on file        Relationship status: Total 09/21/2019 9.2    • Calculated Osmolality 09/21/2019 289    • GFR, Non- 09/21/2019 90    • GFR, -American 09/21/2019 104    • AST 09/21/2019 15    • ALT 09/21/2019 21    • Alkaline Phosphatase 09/21/2019 77    • Bilirubin, Tota formatting which cannot be displayed here. • Recommendations/Comments 09/20/2019                      Value: This result contains rich text formatting which cannot be displayed here. • Procedure 09/20/2019                      Value: This result contains 06/15/2019 15.4    • Calcium, Total 06/15/2019 9.7    • Calculated Osmolality 06/15/2019 289    • GFR, Non- 06/15/2019 84    • GFR, -American 06/15/2019 97    • AST 06/15/2019 17    • ALT 06/15/2019 25    • Alkaline Phosphatase 06/15

## 2019-12-21 NOTE — PATIENT INSTRUCTIONS
Labs next time you get a right upper abdominal pain attack. Nuclear medicine scan recommended for acalculous cholecystitis (rule out non-stone gallbladder malfunction).

## 2019-12-31 ENCOUNTER — HOSPITAL ENCOUNTER (OUTPATIENT)
Dept: NUCLEAR MEDICINE | Facility: HOSPITAL | Age: 59
Discharge: HOME | End: 2019-12-31
Attending: FAMILY MEDICINE
Payer: COMMERCIAL

## 2019-12-31 DIAGNOSIS — R10.11 CHRONIC RUQ PAIN: ICD-10-CM

## 2019-12-31 DIAGNOSIS — G89.29 CHRONIC RUQ PAIN: ICD-10-CM

## 2019-12-31 PROCEDURE — 78227 HEPATOBIL SYST IMAGE W/DRUG: CPT | Performed by: FAMILY MEDICINE

## 2020-01-20 ENCOUNTER — TELEPHONE (OUTPATIENT)
Dept: FAMILY MEDICINE CLINIC | Facility: CLINIC | Age: 60
End: 2020-01-20

## 2020-06-20 DIAGNOSIS — I10 ESSENTIAL HYPERTENSION: ICD-10-CM

## 2020-06-22 RX ORDER — HYDROCHLOROTHIAZIDE 12.5 MG/1
TABLET ORAL
Qty: 30 TABLET | Refills: 0 | Status: SHIPPED | OUTPATIENT
Start: 2020-06-22 | End: 2020-07-02

## 2020-06-22 NOTE — TELEPHONE ENCOUNTER
Due for visit, I sent a month refill and should have labs before visit. An order for labs was entered in December, she should have those done.

## 2020-06-29 ENCOUNTER — APPOINTMENT (OUTPATIENT)
Dept: LAB | Facility: HOSPITAL | Age: 60
End: 2020-06-29
Attending: FAMILY MEDICINE
Payer: COMMERCIAL

## 2020-06-29 DIAGNOSIS — I10 ESSENTIAL HYPERTENSION: ICD-10-CM

## 2020-06-29 DIAGNOSIS — R10.11 CHRONIC RUQ PAIN: ICD-10-CM

## 2020-06-29 DIAGNOSIS — G89.29 CHRONIC RUQ PAIN: ICD-10-CM

## 2020-06-29 LAB
ALBUMIN SERPL-MCNC: 3.9 G/DL (ref 3.4–5)
ALBUMIN/GLOB SERPL: 1.2 {RATIO} (ref 1–2)
ALP LIVER SERPL-CCNC: 72 U/L (ref 46–118)
ALT SERPL-CCNC: 27 U/L (ref 13–56)
AMYLASE SERPL-CCNC: 34 U/L (ref 25–115)
ANION GAP SERPL CALC-SCNC: 1 MMOL/L (ref 0–18)
AST SERPL-CCNC: 19 U/L (ref 15–37)
BILIRUB SERPL-MCNC: 0.3 MG/DL (ref 0.1–2)
BUN BLD-MCNC: 14 MG/DL (ref 7–18)
BUN/CREAT SERPL: 21.9 (ref 10–20)
CALCIUM BLD-MCNC: 9.3 MG/DL (ref 8.5–10.1)
CHLORIDE SERPL-SCNC: 106 MMOL/L (ref 98–112)
CO2 SERPL-SCNC: 33 MMOL/L (ref 21–32)
CREAT BLD-MCNC: 0.64 MG/DL (ref 0.55–1.02)
GLOBULIN PLAS-MCNC: 3.3 G/DL (ref 2.8–4.4)
GLUCOSE BLD-MCNC: 80 MG/DL (ref 70–99)
LIPASE SERPL-CCNC: 94 U/L (ref 73–393)
M PROTEIN MFR SERPL ELPH: 7.2 G/DL (ref 6.4–8.2)
OSMOLALITY SERPL CALC.SUM OF ELEC: 289 MOSM/KG (ref 275–295)
PATIENT FASTING Y/N/NP: YES
POTASSIUM SERPL-SCNC: 3.5 MMOL/L (ref 3.5–5.1)
SODIUM SERPL-SCNC: 140 MMOL/L (ref 136–145)

## 2020-06-29 PROCEDURE — 83690 ASSAY OF LIPASE: CPT

## 2020-06-29 PROCEDURE — 80053 COMPREHEN METABOLIC PANEL: CPT

## 2020-06-29 PROCEDURE — 36415 COLL VENOUS BLD VENIPUNCTURE: CPT

## 2020-06-29 PROCEDURE — 82150 ASSAY OF AMYLASE: CPT

## 2020-07-02 ENCOUNTER — TELEPHONE (OUTPATIENT)
Dept: FAMILY MEDICINE CLINIC | Facility: CLINIC | Age: 60
End: 2020-07-02

## 2020-07-02 ENCOUNTER — OFFICE VISIT (OUTPATIENT)
Dept: FAMILY MEDICINE CLINIC | Facility: CLINIC | Age: 60
End: 2020-07-02
Payer: COMMERCIAL

## 2020-07-02 VITALS
RESPIRATION RATE: 16 BRPM | HEART RATE: 72 BPM | BODY MASS INDEX: 31.46 KG/M2 | TEMPERATURE: 98 F | OXYGEN SATURATION: 98 % | SYSTOLIC BLOOD PRESSURE: 148 MMHG | WEIGHT: 154 LBS | HEIGHT: 58.5 IN | DIASTOLIC BLOOD PRESSURE: 90 MMHG

## 2020-07-02 DIAGNOSIS — Z12.11 SCREEN FOR COLON CANCER: ICD-10-CM

## 2020-07-02 DIAGNOSIS — I10 ESSENTIAL HYPERTENSION: ICD-10-CM

## 2020-07-02 DIAGNOSIS — R25.2 MUSCLE CRAMPS: Primary | ICD-10-CM

## 2020-07-02 PROBLEM — E87.6 HYPOKALEMIA: Status: ACTIVE | Noted: 2020-07-02

## 2020-07-02 PROCEDURE — 99213 OFFICE O/P EST LOW 20 MIN: CPT | Performed by: FAMILY MEDICINE

## 2020-07-02 RX ORDER — IRBESARTAN 300 MG/1
300 TABLET ORAL NIGHTLY
Refills: 0 | Status: CANCELLED | OUTPATIENT
Start: 2020-07-02

## 2020-07-02 RX ORDER — HYDROCHLOROTHIAZIDE 12.5 MG/1
12.5 TABLET ORAL DAILY
Qty: 90 TABLET | Refills: 1 | Status: SHIPPED | OUTPATIENT
Start: 2020-07-02 | End: 2020-07-17

## 2020-07-02 RX ORDER — PROPRANOLOL/HYDROCHLOROTHIAZID 40 MG-25MG
TABLET ORAL
COMMUNITY
Start: 2020-05-01

## 2020-07-02 RX ORDER — POTASSIUM CHLORIDE 750 MG/1
10 TABLET, FILM COATED, EXTENDED RELEASE ORAL DAILY
Qty: 90 TABLET | Refills: 1 | Status: SHIPPED | OUTPATIENT
Start: 2020-07-02 | End: 2020-10-02

## 2020-07-02 RX ORDER — IRBESARTAN 150 MG/1
TABLET ORAL
Qty: 90 TABLET | Refills: 1 | Status: CANCELLED | OUTPATIENT
Start: 2020-07-02

## 2020-07-02 RX ORDER — IRBESARTAN 150 MG/1
150 TABLET ORAL NIGHTLY
Qty: 90 TABLET | Refills: 1 | Status: SHIPPED | OUTPATIENT
Start: 2020-07-02 | End: 2021-01-04

## 2020-07-02 NOTE — PROGRESS NOTES
Uriel Bowie IS A 61year old female 149 [facility] Patient presents with:  Hypertension: medication refill. History of present illness:     R leg cramps mostly, and does get cramps. Mild knee pain, questions swelling.  Working outside, a lot of gardeni Take 1 tablet by mouth daily. • Multiple Vitamins-Minerals (MULTIVITAMIN ADULTS 50+ OR) Take 1 tablet by mouth daily. • IRON OR Take 1 tablet by mouth daily. • Calcium Carbonate Antacid (TUMS OR) Take 1 tablet by mouth daily.      • GLUCOS-CHOND Lifestyle      Physical activity:        Days per week: Not on file        Minutes per session: Not on file      Stress: Not on file    Relationships      Social connections:        Talks on phone: Not on file        Gets together: Not on file        Atten

## 2020-07-02 NOTE — PATIENT INSTRUCTIONS
Start potassium 10 meQ daily to improve potassium level and decrease muscle cramps. Continue same dose irbesartan and HCTZ    See dermatology regularly for skin changes. FIT (stool)  testing for colon cancer screening     Recheck in 3 months.

## 2020-07-10 ENCOUNTER — TELEPHONE (OUTPATIENT)
Dept: FAMILY MEDICINE CLINIC | Facility: CLINIC | Age: 60
End: 2020-07-10

## 2020-07-10 NOTE — TELEPHONE ENCOUNTER
Called pt requesting to receive stool cards when comes in with another pt for scheduled appt on 7/13. Informed will leave stool cards at  to  when checks in. Confirmed lab has been ordered. No further questions or concerns.  Pt verbalized u

## 2020-07-10 NOTE — TELEPHONE ENCOUNTER
Patient stated she will be In the office on Mon 7/13 with son. Could she  the kit for fecal test at that time? She has an appointment Oct 2.

## 2020-07-17 DIAGNOSIS — I10 ESSENTIAL HYPERTENSION: ICD-10-CM

## 2020-07-17 RX ORDER — HYDROCHLOROTHIAZIDE 12.5 MG/1
12.5 TABLET ORAL DAILY
Qty: 90 TABLET | Refills: 1 | Status: SHIPPED | OUTPATIENT
Start: 2020-07-17 | End: 2021-01-04

## 2020-07-17 RX ORDER — HYDROCHLOROTHIAZIDE 12.5 MG/1
TABLET ORAL
Qty: 30 TABLET | Refills: 0 | Status: SHIPPED | OUTPATIENT
Start: 2020-07-17 | End: 2020-07-17

## 2020-07-17 NOTE — TELEPHONE ENCOUNTER
LOV    LAST LAB    LAST RX     Next OV   Future Appointments   Date Time Provider Moi Fine   10/2/2020  9:40 AM Mariela Pemberton MD [facility]         PROTOCOL   Hypertension Medications Protocol Passed7/17 1:06 PM   CMP or BMP in past 12 m

## 2020-07-20 ENCOUNTER — APPOINTMENT (OUTPATIENT)
Dept: LAB | Age: 60
End: 2020-07-20
Attending: FAMILY MEDICINE
Payer: COMMERCIAL

## 2020-07-20 DIAGNOSIS — Z12.11 SCREEN FOR COLON CANCER: ICD-10-CM

## 2020-07-20 PROCEDURE — 82274 ASSAY TEST FOR BLOOD FECAL: CPT

## 2020-08-14 ENCOUNTER — PATIENT MESSAGE (OUTPATIENT)
Dept: FAMILY MEDICINE CLINIC | Facility: CLINIC | Age: 60
End: 2020-08-14

## 2020-08-14 DIAGNOSIS — Z12.31 ENCOUNTER FOR SCREENING MAMMOGRAM FOR BREAST CANCER: Primary | ICD-10-CM

## 2020-08-17 NOTE — TELEPHONE ENCOUNTER
From: Denzel Espinal  To: Srini Jsoeph MD  Sent: 8/14/2020 12:08 PM CDT  Subject: Other    Received a reminder that I am due for a mammogram Sep 14 or shortly after. Please send over request to [address] [place] [address] in early September.      Thank

## 2020-09-24 ENCOUNTER — PATIENT MESSAGE (OUTPATIENT)
Dept: FAMILY MEDICINE CLINIC | Facility: CLINIC | Age: 60
End: 2020-09-24

## 2020-09-24 NOTE — TELEPHONE ENCOUNTER
From: Fausto Peng  To: Maris Nolasco MD  Sent: 9/24/2020 8:34 AM CDT  Subject: Other    Do I have a blood test order sent order to [address] [place] [address] so that we can get potassium levels, etc before my upcoming follow up visit?  I did schedule

## 2020-09-25 ENCOUNTER — TELEPHONE (OUTPATIENT)
Dept: FAMILY MEDICINE CLINIC | Facility: CLINIC | Age: 60
End: 2020-09-25

## 2020-09-25 DIAGNOSIS — R25.2 MUSCLE CRAMPS: ICD-10-CM

## 2020-09-25 DIAGNOSIS — I10 ESSENTIAL HYPERTENSION: Primary | ICD-10-CM

## 2020-09-25 NOTE — TELEPHONE ENCOUNTER
Potassium order is needed. Patient is scheduled for Wednesday the 30th. Contact Marty Foster in central scheduling when order is in the system.       [phone] - telma

## 2020-09-26 ENCOUNTER — HOSPITAL ENCOUNTER (OUTPATIENT)
Dept: MAMMOGRAPHY | Age: 60
Discharge: HOME | End: 2020-09-26
Attending: FAMILY MEDICINE
Payer: COMMERCIAL

## 2020-09-26 DIAGNOSIS — Z12.31 ENCOUNTER FOR SCREENING MAMMOGRAM FOR BREAST CANCER: ICD-10-CM

## 2020-09-26 PROCEDURE — 77067 SCR MAMMO BI INCL CAD: CPT | Performed by: FAMILY MEDICINE

## 2020-09-26 PROCEDURE — 77063 BREAST TOMOSYNTHESIS BI: CPT | Performed by: FAMILY MEDICINE

## 2020-09-30 ENCOUNTER — LAB ENCOUNTER (OUTPATIENT)
Dept: LAB | Facility: HOSPITAL | Age: 60
End: 2020-09-30
Attending: FAMILY MEDICINE
Payer: COMMERCIAL

## 2020-09-30 DIAGNOSIS — I10 ESSENTIAL HYPERTENSION: ICD-10-CM

## 2020-09-30 DIAGNOSIS — R25.2 MUSCLE CRAMPS: ICD-10-CM

## 2020-09-30 PROCEDURE — 36415 COLL VENOUS BLD VENIPUNCTURE: CPT

## 2020-09-30 PROCEDURE — 80048 BASIC METABOLIC PNL TOTAL CA: CPT

## 2020-10-02 ENCOUNTER — OFFICE VISIT (OUTPATIENT)
Dept: FAMILY MEDICINE CLINIC | Facility: CLINIC | Age: 60
End: 2020-10-02
Payer: COMMERCIAL

## 2020-10-02 VITALS
BODY MASS INDEX: 31.67 KG/M2 | HEIGHT: 58.5 IN | HEART RATE: 72 BPM | OXYGEN SATURATION: 99 % | DIASTOLIC BLOOD PRESSURE: 80 MMHG | RESPIRATION RATE: 16 BRPM | WEIGHT: 155 LBS | SYSTOLIC BLOOD PRESSURE: 122 MMHG | TEMPERATURE: 98 F

## 2020-10-02 DIAGNOSIS — Z23 NEED FOR VACCINATION: ICD-10-CM

## 2020-10-02 DIAGNOSIS — I10 ESSENTIAL HYPERTENSION: Primary | ICD-10-CM

## 2020-10-02 PROCEDURE — 99213 OFFICE O/P EST LOW 20 MIN: CPT | Performed by: FAMILY MEDICINE

## 2020-10-02 PROCEDURE — 3008F BODY MASS INDEX DOCD: CPT | Performed by: FAMILY MEDICINE

## 2020-10-02 PROCEDURE — 3079F DIAST BP 80-89 MM HG: CPT | Performed by: FAMILY MEDICINE

## 2020-10-02 PROCEDURE — 90686 IIV4 VACC NO PRSV 0.5 ML IM: CPT | Performed by: FAMILY MEDICINE

## 2020-10-02 PROCEDURE — 3074F SYST BP LT 130 MM HG: CPT | Performed by: FAMILY MEDICINE

## 2020-10-02 PROCEDURE — 90471 IMMUNIZATION ADMIN: CPT | Performed by: FAMILY MEDICINE

## 2020-10-02 RX ORDER — POTASSIUM CHLORIDE 750 MG/1
20 TABLET, FILM COATED, EXTENDED RELEASE ORAL DAILY
Qty: 180 TABLET | Refills: 1 | Status: SHIPPED | OUTPATIENT
Start: 2020-10-02 | End: 2021-01-04

## 2020-10-02 NOTE — PROGRESS NOTES
Isabel Gonsales IS A 61year old female [address] [place] Patient presents with:  Hypertension: F/U. Immunization/Injection: Flu        History of present illness:     Still leg cramps, trying some topical aids. Heat or muscle rubs like icy hot. started K.  Not mu mouth daily. • Calcium Carbonate Antacid (TUMS OR) Take 1 tablet by mouth daily. • GLUCOS-CHONDROIT-COLLAG-HYAL OR Take  by mouth.          Allergy:      No Known Allergies    Family history:       Family History   Problem Relation Age of Onset   • connections        Talks on phone: Not on file        Gets together: Not on file        Attends Latter day service: Not on file        Active member of club or organization: Not on file        Attends meetings of clubs or organizations: Not on file        R Total 09/30/2020 9.8    • Calculated Osmolality 09/30/2020 289    • GFR, Non- 09/30/2020 94    • GFR, -American 09/30/2020 109    • FASTING 09/30/2020 Yes          Assessment & Plan:   Corrie Disla was seen today for hypertension and immun

## 2021-01-01 ENCOUNTER — LAB ENCOUNTER (OUTPATIENT)
Dept: LAB | Facility: HOSPITAL | Age: 61
End: 2021-01-01
Attending: FAMILY MEDICINE
Payer: COMMERCIAL

## 2021-01-01 DIAGNOSIS — I10 ESSENTIAL HYPERTENSION: ICD-10-CM

## 2021-01-01 LAB
ANION GAP SERPL CALC-SCNC: 2 MMOL/L (ref 0–18)
BUN BLD-MCNC: 13 MG/DL (ref 7–18)
BUN/CREAT SERPL: 18.6 (ref 10–20)
CALCIUM BLD-MCNC: 9.5 MG/DL (ref 8.5–10.1)
CHLORIDE SERPL-SCNC: 105 MMOL/L (ref 98–112)
CO2 SERPL-SCNC: 31 MMOL/L (ref 21–32)
CREAT BLD-MCNC: 0.7 MG/DL
GLUCOSE BLD-MCNC: 79 MG/DL (ref 70–99)
OSMOLALITY SERPL CALC.SUM OF ELEC: 285 MOSM/KG (ref 275–295)
PATIENT FASTING Y/N/NP: YES
POTASSIUM SERPL-SCNC: 4 MMOL/L (ref 3.5–5.1)
SODIUM SERPL-SCNC: 138 MMOL/L (ref 136–145)

## 2021-01-01 PROCEDURE — 36415 COLL VENOUS BLD VENIPUNCTURE: CPT

## 2021-01-01 PROCEDURE — 80048 BASIC METABOLIC PNL TOTAL CA: CPT

## 2021-01-04 ENCOUNTER — OFFICE VISIT (OUTPATIENT)
Dept: FAMILY MEDICINE CLINIC | Facility: CLINIC | Age: 61
End: 2021-01-04
Payer: COMMERCIAL

## 2021-01-04 VITALS
HEIGHT: 58.5 IN | SYSTOLIC BLOOD PRESSURE: 134 MMHG | DIASTOLIC BLOOD PRESSURE: 78 MMHG | OXYGEN SATURATION: 98 % | WEIGHT: 160 LBS | TEMPERATURE: 97 F | HEART RATE: 70 BPM | BODY MASS INDEX: 32.69 KG/M2 | RESPIRATION RATE: 16 BRPM

## 2021-01-04 DIAGNOSIS — I10 ESSENTIAL HYPERTENSION: ICD-10-CM

## 2021-01-04 DIAGNOSIS — I10 ESSENTIAL HYPERTENSION, BENIGN: Primary | ICD-10-CM

## 2021-01-04 PROCEDURE — 3075F SYST BP GE 130 - 139MM HG: CPT | Performed by: FAMILY MEDICINE

## 2021-01-04 PROCEDURE — 99213 OFFICE O/P EST LOW 20 MIN: CPT | Performed by: FAMILY MEDICINE

## 2021-01-04 PROCEDURE — 3078F DIAST BP <80 MM HG: CPT | Performed by: FAMILY MEDICINE

## 2021-01-04 PROCEDURE — 3008F BODY MASS INDEX DOCD: CPT | Performed by: FAMILY MEDICINE

## 2021-01-04 RX ORDER — HYDROCHLOROTHIAZIDE 12.5 MG/1
12.5 TABLET ORAL DAILY
Qty: 90 TABLET | Refills: 1 | Status: SHIPPED | OUTPATIENT
Start: 2021-01-04 | End: 2021-07-08

## 2021-01-04 RX ORDER — IRBESARTAN 150 MG/1
150 TABLET ORAL NIGHTLY
Qty: 90 TABLET | Refills: 1 | Status: SHIPPED | OUTPATIENT
Start: 2021-01-04 | End: 2021-07-08

## 2021-01-04 RX ORDER — POTASSIUM CHLORIDE 750 MG/1
20 TABLET, FILM COATED, EXTENDED RELEASE ORAL DAILY
Qty: 180 TABLET | Refills: 1 | Status: SHIPPED | OUTPATIENT
Start: 2021-01-04 | End: 2021-04-06

## 2021-01-04 NOTE — PROGRESS NOTES
John Mccabe IS A 61year old female [address] [place] Patient presents with: Follow - Up: on 3 months       History of present illness:       Has felt well, BP today was after a flight of steps. No leg cramping from the potassium.  Has L5-S1 sharp back pain by mouth daily. • Multiple Vitamins-Minerals (MULTIVITAMIN ADULTS 50+ OR) Take 1 tablet by mouth daily. • IRON OR Take 1 tablet by mouth daily. • Calcium Carbonate Antacid (TUMS OR) Take 1 tablet by mouth daily.      • GLUCOS-CHONDROIT-COLLAG-HY activity        Days per week: Not on file        Minutes per session: Not on file      Stress: Not on file    Relationships      Social connections        Talks on phone: Not on file        Gets together: Not on file        Attends [employer] service: Not BUN/CREA Ratio 01/01/2021 18.6    • Calcium, Total 01/01/2021 9.5    • Calculated Osmolality 01/01/2021 285    • GFR, Non- 01/01/2021 94    • GFR, -American 01/01/2021 109    • FASTING 01/01/2021 Yes    [facility] Lab Encounter on 09/30/202 Cholesterol, Total 09/30/2019 159    • HDL Cholesterol 09/30/2019 70    • Direct LDL 09/30/2019 76    • Triglycerides 09/30/2019 64    • Glucose 09/30/2019 93    • CREATININE 09/30/2019 0.89          Assessment & Plan:   Corrie Lucias was seen today for follow -

## 2021-03-17 ENCOUNTER — PATIENT MESSAGE (OUTPATIENT)
Dept: FAMILY MEDICINE CLINIC | Facility: CLINIC | Age: 61
End: 2021-03-17

## 2021-03-18 NOTE — TELEPHONE ENCOUNTER
From: Jay Meadows  To: Violetta Oppenheim, MD  Sent: 3/17/2021 4:09 PM CDT  Subject: Other    Good afternoon.  I just learned that I will be returning to in the building \" duty with [facility] effective April 7 but missed the cut off to register for a

## 2021-04-02 DIAGNOSIS — I10 ESSENTIAL HYPERTENSION: ICD-10-CM

## 2021-04-05 NOTE — TELEPHONE ENCOUNTER
LOV 1/4/2021    LAST LAB 1/1/2021    LAST RX   Potassium Chloride ER 10 MEQ Oral Tab  tablet 1 1/4/2021    Sig:   Take 2 tablets (20 mEq total) by mouth daily.      Route:   Oral           Next OV   Future Appointments   Date Time Provider Department

## 2021-04-06 RX ORDER — POTASSIUM CHLORIDE 750 MG/1
TABLET, FILM COATED, EXTENDED RELEASE ORAL
Qty: 180 TABLET | Refills: 1 | Status: SHIPPED | OUTPATIENT
Start: 2021-04-06 | End: 2021-10-07

## 2021-04-09 ENCOUNTER — PATIENT MESSAGE (OUTPATIENT)
Dept: FAMILY MEDICINE CLINIC | Facility: CLINIC | Age: 61
End: 2021-04-09

## 2021-04-09 NOTE — TELEPHONE ENCOUNTER
From: Randal Castañeda  To: Teresa Brewster MD  Sent: 4/9/2021 9:26 AM CDT  Subject: Other    How do I jeff [facility] to show I have received the vaccine through "Mobile Location, IP" and stop the messages to book appointment?  I wish [employer] got the offer through [facility]

## 2021-07-08 ENCOUNTER — OFFICE VISIT (OUTPATIENT)
Dept: FAMILY MEDICINE CLINIC | Facility: CLINIC | Age: 61
End: 2021-07-08
Payer: COMMERCIAL

## 2021-07-08 VITALS
HEIGHT: 58.5 IN | RESPIRATION RATE: 16 BRPM | BODY MASS INDEX: 32.69 KG/M2 | OXYGEN SATURATION: 97 % | WEIGHT: 160 LBS | DIASTOLIC BLOOD PRESSURE: 70 MMHG | HEART RATE: 68 BPM | TEMPERATURE: 97 F | SYSTOLIC BLOOD PRESSURE: 120 MMHG

## 2021-07-08 DIAGNOSIS — E87.6 HYPOKALEMIA: ICD-10-CM

## 2021-07-08 DIAGNOSIS — Z00.00 LABORATORY EXAMINATION ORDERED AS PART OF A ROUTINE GENERAL MEDICAL EXAMINATION: ICD-10-CM

## 2021-07-08 DIAGNOSIS — I10 ESSENTIAL HYPERTENSION: Primary | ICD-10-CM

## 2021-07-08 DIAGNOSIS — Z12.11 COLON CANCER SCREENING: ICD-10-CM

## 2021-07-08 PROCEDURE — 3074F SYST BP LT 130 MM HG: CPT | Performed by: NURSE PRACTITIONER

## 2021-07-08 PROCEDURE — 3008F BODY MASS INDEX DOCD: CPT | Performed by: NURSE PRACTITIONER

## 2021-07-08 PROCEDURE — 3078F DIAST BP <80 MM HG: CPT | Performed by: NURSE PRACTITIONER

## 2021-07-08 PROCEDURE — 99213 OFFICE O/P EST LOW 20 MIN: CPT | Performed by: NURSE PRACTITIONER

## 2021-07-08 RX ORDER — HYDROCHLOROTHIAZIDE 12.5 MG/1
12.5 TABLET ORAL DAILY
Qty: 90 TABLET | Refills: 1 | Status: SHIPPED | OUTPATIENT
Start: 2021-07-08 | End: 2021-10-07

## 2021-07-08 RX ORDER — IRBESARTAN 150 MG/1
150 TABLET ORAL NIGHTLY
Qty: 90 TABLET | Refills: 1 | Status: SHIPPED | OUTPATIENT
Start: 2021-07-08 | End: 2021-10-07

## 2021-07-08 NOTE — PROGRESS NOTES
CHIEF COMPLAINT:     Patient presents with: Follow - Up: 6mo  Hypertension      HPI:   Caitie Jeffers is a 61year old female patient presents for recheck of her hypertension.  Pt has been taking medications as instructed, no medication side effects, home Gestational diabetes    • Leukopenia     since at least 1999   • Menopause     age 39   • Osteopenia    • Other neutropenia (HCC)    • Pain in joint, lower leg    • Positive KENY (antinuclear antibody)    • Pyelonephritis 06/80   • Unspecified essential hyp PANEL; Future    4. Colon cancer screening  - FIT card given, pt declines c-scope    The patient indicates understanding of these issues and agrees to the plan. The patient is asked to return for annual physical around September/October.

## 2021-09-24 ENCOUNTER — LAB ENCOUNTER (OUTPATIENT)
Dept: LAB | Age: 61
End: 2021-09-24
Attending: FAMILY MEDICINE
Payer: COMMERCIAL

## 2021-09-24 DIAGNOSIS — Z12.11 COLON CANCER SCREENING: ICD-10-CM

## 2021-09-24 PROCEDURE — 82274 ASSAY TEST FOR BLOOD FECAL: CPT

## 2021-09-25 ENCOUNTER — LAB ENCOUNTER (OUTPATIENT)
Dept: LAB | Facility: HOSPITAL | Age: 61
End: 2021-09-25
Attending: FAMILY MEDICINE
Payer: COMMERCIAL

## 2021-09-25 DIAGNOSIS — Z00.00 LABORATORY EXAMINATION ORDERED AS PART OF A ROUTINE GENERAL MEDICAL EXAMINATION: ICD-10-CM

## 2021-09-25 DIAGNOSIS — E87.6 HYPOKALEMIA: ICD-10-CM

## 2021-09-25 LAB
ALBUMIN SERPL-MCNC: 3.9 G/DL (ref 3.4–5)
ALBUMIN/GLOB SERPL: 1.1 {RATIO} (ref 1–2)
ALP LIVER SERPL-CCNC: 77 U/L
ALT SERPL-CCNC: 36 U/L
ANION GAP SERPL CALC-SCNC: 2 MMOL/L (ref 0–18)
AST SERPL-CCNC: 23 U/L (ref 15–37)
BASOPHILS # BLD AUTO: 0.04 X10(3) UL (ref 0–0.2)
BASOPHILS NFR BLD AUTO: 1.4 %
BILIRUB SERPL-MCNC: 0.4 MG/DL (ref 0.1–2)
BUN BLD-MCNC: 11 MG/DL (ref 7–18)
CALCIUM BLD-MCNC: 9.3 MG/DL (ref 8.5–10.1)
CHLORIDE SERPL-SCNC: 107 MMOL/L (ref 98–112)
CHOLEST SERPL-MCNC: 220 MG/DL (ref ?–200)
CO2 SERPL-SCNC: 30 MMOL/L (ref 21–32)
CREAT BLD-MCNC: 0.59 MG/DL
EOSINOPHIL # BLD AUTO: 0.09 X10(3) UL (ref 0–0.7)
EOSINOPHIL NFR BLD AUTO: 3.2 %
ERYTHROCYTE [DISTWIDTH] IN BLOOD BY AUTOMATED COUNT: 12.7 %
EST. AVERAGE GLUCOSE BLD GHB EST-MCNC: 97 MG/DL (ref 68–126)
GLOBULIN PLAS-MCNC: 3.4 G/DL (ref 2.8–4.4)
GLUCOSE BLD-MCNC: 83 MG/DL (ref 70–99)
HBA1C MFR BLD HPLC: 5 % (ref ?–5.7)
HCT VFR BLD AUTO: 38.4 %
HDLC SERPL-MCNC: 76 MG/DL (ref 40–59)
HEMOCCULT STL QL: NEGATIVE
HGB BLD-MCNC: 12.7 G/DL
IMM GRANULOCYTES # BLD AUTO: 0 X10(3) UL (ref 0–1)
IMM GRANULOCYTES NFR BLD: 0 %
LDLC SERPL CALC-MCNC: 120 MG/DL (ref ?–100)
LYMPHOCYTES # BLD AUTO: 1.04 X10(3) UL (ref 1–4)
LYMPHOCYTES NFR BLD AUTO: 36.6 %
MCH RBC QN AUTO: 30.1 PG (ref 26–34)
MCHC RBC AUTO-ENTMCNC: 33.1 G/DL (ref 31–37)
MCV RBC AUTO: 91 FL
MONOCYTES # BLD AUTO: 0.29 X10(3) UL (ref 0.1–1)
MONOCYTES NFR BLD AUTO: 10.2 %
NEUTROPHILS # BLD AUTO: 1.38 X10 (3) UL (ref 1.5–7.7)
NEUTROPHILS # BLD AUTO: 1.38 X10(3) UL (ref 1.5–7.7)
NEUTROPHILS NFR BLD AUTO: 48.6 %
NONHDLC SERPL-MCNC: 144 MG/DL (ref ?–130)
OSMOLALITY SERPL CALC.SUM OF ELEC: 287 MOSM/KG (ref 275–295)
PATIENT FASTING Y/N/NP: YES
PATIENT FASTING Y/N/NP: YES
PLATELET # BLD AUTO: 282 10(3)UL (ref 150–450)
POTASSIUM SERPL-SCNC: 3.8 MMOL/L (ref 3.5–5.1)
PROT SERPL-MCNC: 7.3 G/DL (ref 6.4–8.2)
RBC # BLD AUTO: 4.22 X10(6)UL
SODIUM SERPL-SCNC: 139 MMOL/L (ref 136–145)
TRIGL SERPL-MCNC: 140 MG/DL (ref 30–149)
TSI SER-ACNC: 3.01 MIU/ML (ref 0.36–3.74)
VLDLC SERPL CALC-MCNC: 25 MG/DL (ref 0–30)
WBC # BLD AUTO: 2.8 X10(3) UL (ref 4–11)

## 2021-09-25 PROCEDURE — 83036 HEMOGLOBIN GLYCOSYLATED A1C: CPT

## 2021-09-25 PROCEDURE — 36415 COLL VENOUS BLD VENIPUNCTURE: CPT

## 2021-09-25 PROCEDURE — 80061 LIPID PANEL: CPT

## 2021-09-25 PROCEDURE — 80053 COMPREHEN METABOLIC PANEL: CPT

## 2021-09-25 PROCEDURE — 85025 COMPLETE CBC W/AUTO DIFF WBC: CPT

## 2021-09-25 PROCEDURE — 84443 ASSAY THYROID STIM HORMONE: CPT

## 2021-10-07 ENCOUNTER — OFFICE VISIT (OUTPATIENT)
Dept: FAMILY MEDICINE CLINIC | Facility: CLINIC | Age: 61
End: 2021-10-07
Payer: COMMERCIAL

## 2021-10-07 VITALS
RESPIRATION RATE: 16 BRPM | HEART RATE: 65 BPM | HEIGHT: 58.25 IN | BODY MASS INDEX: 33.34 KG/M2 | WEIGHT: 161 LBS | SYSTOLIC BLOOD PRESSURE: 129 MMHG | DIASTOLIC BLOOD PRESSURE: 65 MMHG | TEMPERATURE: 99 F

## 2021-10-07 DIAGNOSIS — Z23 NEED FOR VACCINATION: ICD-10-CM

## 2021-10-07 DIAGNOSIS — Z13.89 SCREENING FOR GENITOURINARY CONDITION: ICD-10-CM

## 2021-10-07 DIAGNOSIS — I10 ESSENTIAL HYPERTENSION: ICD-10-CM

## 2021-10-07 DIAGNOSIS — E66.09 CLASS 1 OBESITY DUE TO EXCESS CALORIES WITHOUT SERIOUS COMORBIDITY WITH BODY MASS INDEX (BMI) OF 33.0 TO 33.9 IN ADULT: ICD-10-CM

## 2021-10-07 DIAGNOSIS — H91.93 DECREASED HEARING OF BOTH EARS: ICD-10-CM

## 2021-10-07 DIAGNOSIS — Z13.6 SCREENING FOR CARDIOVASCULAR CONDITION: ICD-10-CM

## 2021-10-07 DIAGNOSIS — Z12.31 ENCOUNTER FOR SCREENING MAMMOGRAM FOR MALIGNANT NEOPLASM OF BREAST: ICD-10-CM

## 2021-10-07 DIAGNOSIS — Z00.01 ENCOUNTER FOR ROUTINE ADULT HEALTH EXAMINATION WITH ABNORMAL FINDINGS: Primary | ICD-10-CM

## 2021-10-07 PROCEDURE — 3074F SYST BP LT 130 MM HG: CPT | Performed by: STUDENT IN AN ORGANIZED HEALTH CARE EDUCATION/TRAINING PROGRAM

## 2021-10-07 PROCEDURE — 3078F DIAST BP <80 MM HG: CPT | Performed by: STUDENT IN AN ORGANIZED HEALTH CARE EDUCATION/TRAINING PROGRAM

## 2021-10-07 PROCEDURE — 3008F BODY MASS INDEX DOCD: CPT | Performed by: STUDENT IN AN ORGANIZED HEALTH CARE EDUCATION/TRAINING PROGRAM

## 2021-10-07 PROCEDURE — 90471 IMMUNIZATION ADMIN: CPT | Performed by: STUDENT IN AN ORGANIZED HEALTH CARE EDUCATION/TRAINING PROGRAM

## 2021-10-07 PROCEDURE — 90686 IIV4 VACC NO PRSV 0.5 ML IM: CPT | Performed by: STUDENT IN AN ORGANIZED HEALTH CARE EDUCATION/TRAINING PROGRAM

## 2021-10-07 PROCEDURE — 99396 PREV VISIT EST AGE 40-64: CPT | Performed by: STUDENT IN AN ORGANIZED HEALTH CARE EDUCATION/TRAINING PROGRAM

## 2021-10-07 RX ORDER — IRBESARTAN 150 MG/1
150 TABLET ORAL NIGHTLY
Qty: 90 TABLET | Refills: 0 | Status: SHIPPED | OUTPATIENT
Start: 2021-10-07 | End: 2022-01-05

## 2021-10-07 RX ORDER — HYDROCHLOROTHIAZIDE 12.5 MG/1
12.5 TABLET ORAL DAILY
Qty: 90 TABLET | Refills: 1 | Status: SHIPPED | OUTPATIENT
Start: 2021-10-07

## 2021-10-07 RX ORDER — POTASSIUM CHLORIDE 750 MG/1
20 TABLET, FILM COATED, EXTENDED RELEASE ORAL DAILY
Qty: 180 TABLET | Refills: 0 | Status: SHIPPED | OUTPATIENT
Start: 2021-10-07 | End: 2022-01-10

## 2021-10-07 NOTE — PROGRESS NOTES
CC: Annual Physical Exam    HPI:   Yves Hernández is a 64year old female who presents for a complete physical exam. Pt presents to establish care. Symptoms: denies discharge, itching, burning or dysuria.      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 90 tablet 0   • hydrochlorothiazide 12.5 MG Oral Tab Take 1 tablet (12.5 mg total) by mouth daily.  90 tablet Female    • Cancer Other         GI ca-liver, grandfather, great aunt   • Heart Disease Other         CAD, grandmother   • Heart Attack Other         grandmother   • Hypertension Father    • Asthma Father    • Eye Problems Father         cataract   • Hyper (Oral)   Resp 16   Ht 4' 10.25\" (1.48 m)   Wt 161 lb (73 kg)   BMI 33.36 kg/m²   Body mass index is 33.36 kg/m².    GENERAL: well developed, well nourished, in no apparent distress  SKIN: no rashes, no suspicious lesions  HEENT: atraumatic, normocephalic,e

## 2021-10-30 ENCOUNTER — HOSPITAL ENCOUNTER (OUTPATIENT)
Dept: MAMMOGRAPHY | Age: 61
Discharge: HOME | End: 2021-10-30
Attending: STUDENT IN AN ORGANIZED HEALTH CARE EDUCATION/TRAINING PROGRAM
Payer: COMMERCIAL

## 2021-10-30 DIAGNOSIS — Z12.31 ENCOUNTER FOR SCREENING MAMMOGRAM FOR MALIGNANT NEOPLASM OF BREAST: ICD-10-CM

## 2021-10-30 DIAGNOSIS — Z00.01 ENCOUNTER FOR ROUTINE ADULT HEALTH EXAMINATION WITH ABNORMAL FINDINGS: ICD-10-CM

## 2021-10-30 PROCEDURE — 77063 BREAST TOMOSYNTHESIS BI: CPT | Performed by: STUDENT IN AN ORGANIZED HEALTH CARE EDUCATION/TRAINING PROGRAM

## 2021-10-30 PROCEDURE — 77067 SCR MAMMO BI INCL CAD: CPT | Performed by: STUDENT IN AN ORGANIZED HEALTH CARE EDUCATION/TRAINING PROGRAM

## 2021-11-18 ENCOUNTER — ORDER TRANSCRIPTION (OUTPATIENT)
Dept: ADMINISTRATIVE | Facility: HOSPITAL | Age: 61
End: 2021-11-18

## 2021-11-18 DIAGNOSIS — Z13.6 SCREENING FOR CARDIOVASCULAR CONDITION: Primary | ICD-10-CM

## 2021-11-22 ENCOUNTER — HOSPITAL ENCOUNTER (OUTPATIENT)
Dept: CT IMAGING | Facility: HOSPITAL | Age: 61
Discharge: HOME | End: 2021-11-22
Attending: STUDENT IN AN ORGANIZED HEALTH CARE EDUCATION/TRAINING PROGRAM

## 2021-11-22 DIAGNOSIS — Z13.6 SCREENING FOR CARDIOVASCULAR CONDITION: ICD-10-CM

## 2021-11-24 ENCOUNTER — PATIENT MESSAGE (OUTPATIENT)
Dept: FAMILY MEDICINE CLINIC | Facility: CLINIC | Age: 61
End: 2021-11-24

## 2021-12-01 DIAGNOSIS — I70.90 ATHEROMATOUS PLAQUE: Primary | ICD-10-CM

## 2021-12-01 DIAGNOSIS — Q25.40 ABNORMALITY OF THORACIC AORTA: ICD-10-CM

## 2021-12-03 ENCOUNTER — PATIENT MESSAGE (OUTPATIENT)
Dept: FAMILY MEDICINE CLINIC | Facility: CLINIC | Age: 61
End: 2021-12-03

## 2021-12-03 NOTE — TELEPHONE ENCOUNTER
Called to [facility] and had to leave message for [facility] go to person Ilana Mathew on her VM. Lm asking for her to cb on [phone] to discuss what needs to be done to get the pt's CTA Chest Aorta to be approved so pt can schedule.

## 2021-12-03 NOTE — TELEPHONE ENCOUNTER
From: Evie Newell  To: Jose Suárez MD  Sent: 11/24/2021 4:20 PM CST  Subject: Pooja Clore Prescription - Possible Interaction    Happy Thanksgiving.    Onofre Meng saw Dr. Laveda Gosselin last Tuesday, November 16 and he started her on two new

## 2021-12-03 NOTE — TELEPHONE ENCOUNTER
From: Ashok Akers  To: Jakub Dalton MD  Sent: 12/3/2021 2:11 PM CST  Subject: Calcium Scoring Scan confirmed approved by my insurance? Good afternoon. I was told to wait three days for insurance to approve before scheduling.  Has No

## 2021-12-06 ENCOUNTER — LAB ENCOUNTER (OUTPATIENT)
Dept: LAB | Facility: HOSPITAL | Age: 61
End: 2021-12-06
Attending: STUDENT IN AN ORGANIZED HEALTH CARE EDUCATION/TRAINING PROGRAM
Payer: COMMERCIAL

## 2021-12-06 DIAGNOSIS — I10 ESSENTIAL HYPERTENSION: ICD-10-CM

## 2021-12-06 DIAGNOSIS — Z13.89 SCREENING FOR GENITOURINARY CONDITION: ICD-10-CM

## 2021-12-06 PROCEDURE — 81003 URINALYSIS AUTO W/O SCOPE: CPT

## 2021-12-06 NOTE — TELEPHONE ENCOUNTER
Received message from [facility] stating pt's CTA chest has been approved-no auth required. Call to pt w this info, advised to proceed w scheduling.  Patient voices understanding/agrees with plan/no further questions and confirms has [facility]

## 2021-12-15 ENCOUNTER — PATIENT MESSAGE (OUTPATIENT)
Dept: FAMILY MEDICINE CLINIC | Facility: CLINIC | Age: 61
End: 2021-12-15

## 2021-12-17 ENCOUNTER — HOSPITAL ENCOUNTER (OUTPATIENT)
Dept: CT IMAGING | Facility: HOSPITAL | Age: 61
Discharge: HOME | End: 2021-12-17
Attending: STUDENT IN AN ORGANIZED HEALTH CARE EDUCATION/TRAINING PROGRAM
Payer: COMMERCIAL

## 2021-12-17 DIAGNOSIS — Q25.40 ABNORMALITY OF THORACIC AORTA: ICD-10-CM

## 2021-12-17 DIAGNOSIS — I70.90 ATHEROMATOUS PLAQUE: ICD-10-CM

## 2021-12-17 PROCEDURE — 82565 ASSAY OF CREATININE: CPT

## 2021-12-17 PROCEDURE — 71275 CT ANGIOGRAPHY CHEST: CPT | Performed by: STUDENT IN AN ORGANIZED HEALTH CARE EDUCATION/TRAINING PROGRAM

## 2021-12-17 NOTE — TELEPHONE ENCOUNTER
From: Zeeshan Chang  To: Juan Bates MD  Sent: 12/3/2021 2:11 PM CST  Subject: Calcium Scoring Scan confirmed approved by my insurance? Good afternoon. I was told to wait three days for insurance to approve before scheduling.  Has

## 2022-01-05 ENCOUNTER — PATIENT MESSAGE (OUTPATIENT)
Dept: FAMILY MEDICINE CLINIC | Facility: CLINIC | Age: 62
End: 2022-01-05

## 2022-01-05 NOTE — TELEPHONE ENCOUNTER
From: Chad Schafer  To: Emma Dunlap MD  Sent: 1/5/2022 10:35 AM CST  Subject: Lipid panel    Looks like I have not had a lipid panel since September? I have been very intentional about watching fats the last two months.  However, whe

## 2022-01-10 DIAGNOSIS — I10 ESSENTIAL HYPERTENSION: ICD-10-CM

## 2022-01-10 RX ORDER — POTASSIUM CHLORIDE 750 MG/1
TABLET, FILM COATED, EXTENDED RELEASE ORAL
Qty: 180 TABLET | Refills: 0 | Status: SHIPPED | OUTPATIENT
Start: 2022-01-10

## 2022-01-17 ENCOUNTER — TELEMEDICINE (OUTPATIENT)
Dept: FAMILY MEDICINE CLINIC | Facility: CLINIC | Age: 62
End: 2022-01-17
Payer: COMMERCIAL

## 2022-01-17 ENCOUNTER — PATIENT MESSAGE (OUTPATIENT)
Dept: FAMILY MEDICINE CLINIC | Facility: CLINIC | Age: 62
End: 2022-01-17

## 2022-01-17 DIAGNOSIS — E78.2 MIXED HYPERLIPIDEMIA: ICD-10-CM

## 2022-01-17 DIAGNOSIS — I70.0 THORACIC AORTIC ATHEROSCLEROSIS (HCC): Primary | ICD-10-CM

## 2022-01-17 PROCEDURE — 99214 OFFICE O/P EST MOD 30 MIN: CPT | Performed by: STUDENT IN AN ORGANIZED HEALTH CARE EDUCATION/TRAINING PROGRAM

## 2022-01-17 NOTE — PATIENT INSTRUCTIONS
Controlling Your Cholesterol  Cholesterol is a waxy substance. Your body needs it to stay healthy. But too much can cause problems. It travels in your blood through the blood vessels.  If you have a lot of cholesterol in your blood, it can build up carlos manuel These help lower total blood cholesterol. Omega-3 fatty acids lowers triglyceride levels, another form of fat in the blood. If you are pregnant, planning to be pregnant, or are breastfeeding, talk with your healthcare provider for advice.  Ask about the bes

## 2022-01-17 NOTE — PROGRESS NOTES
Subjective     HPI:   Rasheed Gregory verbally consents to a Virtual/Telephone Check-In service on 01/17/22. Patient understands and accepts financial responsibility for any deductible, co-insurance and/or co-pays associated with this service.  This visit i connections        Assessment    Diagnoses and all orders for this visit:    Thoracic aortic atherosclerosis ([address]  Patient has a history of HLD and recently diagnosed with mild atherosclerotic plaque of descending thoracic aorta see on CT calcium score an

## 2022-01-18 NOTE — TELEPHONE ENCOUNTER
From: Theresa Anthony  To: Cruzito Wilder MD  Sent: 1/17/2022 7:06 PM CST  Subject: Discontinue OTC Calcium?     In November after receiving the CT results I discontinued my nightly antacid which I had used as an OTC calcium supplement to

## 2022-01-22 ENCOUNTER — LAB ENCOUNTER (OUTPATIENT)
Dept: LAB | Facility: HOSPITAL | Age: 62
End: 2022-01-22
Attending: STUDENT IN AN ORGANIZED HEALTH CARE EDUCATION/TRAINING PROGRAM
Payer: COMMERCIAL

## 2022-01-22 DIAGNOSIS — E78.2 MIXED HYPERLIPIDEMIA: ICD-10-CM

## 2022-01-22 DIAGNOSIS — I70.0 THORACIC AORTIC ATHEROSCLEROSIS (HCC): ICD-10-CM

## 2022-01-22 LAB
CHOLEST SERPL-MCNC: 211 MG/DL (ref ?–200)
FASTING PATIENT LIPID ANSWER: YES
HDLC SERPL-MCNC: 75 MG/DL (ref 40–59)
LDLC SERPL CALC-MCNC: 123 MG/DL (ref ?–100)
NONHDLC SERPL-MCNC: 136 MG/DL (ref ?–130)
TRIGL SERPL-MCNC: 71 MG/DL (ref 30–149)
VLDLC SERPL CALC-MCNC: 12 MG/DL (ref 0–30)

## 2022-01-22 PROCEDURE — 36415 COLL VENOUS BLD VENIPUNCTURE: CPT

## 2022-01-22 PROCEDURE — 80061 LIPID PANEL: CPT

## 2022-01-23 DIAGNOSIS — I70.0 THORACIC AORTIC ATHEROSCLEROSIS (HCC): Primary | ICD-10-CM

## 2022-01-23 DIAGNOSIS — E78.2 MIXED HYPERLIPIDEMIA: ICD-10-CM

## 2022-01-23 RX ORDER — ROSUVASTATIN CALCIUM 5 MG/1
5 TABLET, COATED ORAL NIGHTLY
Qty: 90 TABLET | Refills: 0 | Status: SHIPPED | OUTPATIENT
Start: 2022-01-23

## 2022-03-22 RX ORDER — ROSUVASTATIN CALCIUM 5 MG/1
TABLET, COATED ORAL
Qty: 90 TABLET | Refills: 1 | Status: SHIPPED | OUTPATIENT
Start: 2022-03-22

## 2022-03-22 NOTE — TELEPHONE ENCOUNTER
Cholesterol Medication Protocol Passed 03/22/2022 08:03 AM   Protocol Details  ALT < 80    ALT resulted within past year    Lipid panel within past 12 months    Appointment within past 12 or next 3 months     LOV 1/17/22 telemedicine     LAST LAB 1/22/22    LAST RX   1/10/22    Next OV   Future Appointments   Date Time Provider Moi Fine   4/4/2022  4:15 PM Thang Mata MD [facility]         PROTOCOL pass

## 2022-04-25 RX ORDER — POTASSIUM CHLORIDE 750 MG/1
20 TABLET, FILM COATED, EXTENDED RELEASE ORAL DAILY
Qty: 180 TABLET | Refills: 0 | Status: SHIPPED | OUTPATIENT
Start: 2022-04-25

## 2022-04-25 RX ORDER — ROSUVASTATIN CALCIUM 5 MG/1
5 TABLET, COATED ORAL NIGHTLY
Qty: 90 TABLET | Refills: 0 | Status: SHIPPED | OUTPATIENT
Start: 2022-04-25

## 2022-04-25 RX ORDER — HYDROCHLOROTHIAZIDE 12.5 MG/1
TABLET ORAL
Qty: 90 TABLET | Refills: 1 | OUTPATIENT
Start: 2022-04-25

## 2022-04-25 RX ORDER — IRBESARTAN 150 MG/1
TABLET ORAL
Qty: 90 TABLET | Refills: 0 | Status: SHIPPED | OUTPATIENT
Start: 2022-04-25

## 2022-04-25 RX ORDER — POTASSIUM CHLORIDE 750 MG/1
TABLET, FILM COATED, EXTENDED RELEASE ORAL
Qty: 180 TABLET | Refills: 0 | OUTPATIENT
Start: 2022-04-25

## 2022-04-25 RX ORDER — HYDROCHLOROTHIAZIDE 12.5 MG/1
12.5 TABLET ORAL DAILY
Qty: 90 TABLET | Refills: 0 | Status: SHIPPED | OUTPATIENT
Start: 2022-04-25

## 2022-04-25 NOTE — TELEPHONE ENCOUNTER
Last office visit 10/7/21 then a televisit  1/17/22. Last refill      1/10/22 #180 tablets. Last CMP K level was 3.8 (3.5-5.1) 9/25/21. Please review and refill if appropriate.   Thank you

## 2022-04-25 NOTE — TELEPHONE ENCOUNTER
Hypertension Medications Protocol Failed 04/24/2022 09:54 PM   Protocol Details  Appointment in past 6 or next 3 months    CMP or BMP in past 12 months    Last serum creatinine< 2.0     LOV 1/17/22 video visit     LAST LAB 1/22/22     LAST RX 10/7/21 90 tabs     Next OV   Future Appointments   Date Time Provider Moi Fine   7/6/2022 10:45 AM Radha Louis MD [facility]         PROTOCOL failed

## 2022-06-01 ENCOUNTER — TELEPHONE (OUTPATIENT)
Dept: FAMILY MEDICINE CLINIC | Facility: CLINIC | Age: 62
End: 2022-06-01

## 2022-06-01 DIAGNOSIS — Z79.899 LONG-TERM USE OF HIGH-RISK MEDICATION: Primary | ICD-10-CM

## 2022-06-01 DIAGNOSIS — E78.00 HYPERCHOLESTEROLEMIA: ICD-10-CM

## 2022-06-01 NOTE — TELEPHONE ENCOUNTER
Pt scheduled for follow up med ck on 6/8/22 and would like to know if any lab work is required. Pt ok with a el?t message to let her know when or if labs are ordered.

## 2022-06-03 ENCOUNTER — LAB ENCOUNTER (OUTPATIENT)
Dept: LAB | Facility: HOSPITAL | Age: 62
End: 2022-06-03
Attending: STUDENT IN AN ORGANIZED HEALTH CARE EDUCATION/TRAINING PROGRAM
Payer: COMMERCIAL

## 2022-06-03 DIAGNOSIS — Z79.899 LONG-TERM USE OF HIGH-RISK MEDICATION: ICD-10-CM

## 2022-06-03 DIAGNOSIS — E78.00 HYPERCHOLESTEROLEMIA: ICD-10-CM

## 2022-06-03 LAB
ALBUMIN SERPL-MCNC: 3.6 G/DL (ref 3.4–5)
ALBUMIN/GLOB SERPL: 1 {RATIO} (ref 1–2)
ALP LIVER SERPL-CCNC: 70 U/L
ALT SERPL-CCNC: 42 U/L
ANION GAP SERPL CALC-SCNC: 5 MMOL/L (ref 0–18)
AST SERPL-CCNC: 31 U/L (ref 15–37)
BILIRUB SERPL-MCNC: 0.4 MG/DL (ref 0.1–2)
BUN BLD-MCNC: 11 MG/DL (ref 7–18)
CALCIUM BLD-MCNC: 9.7 MG/DL (ref 8.5–10.1)
CHLORIDE SERPL-SCNC: 104 MMOL/L (ref 98–112)
CHOLEST SERPL-MCNC: 153 MG/DL (ref ?–200)
CO2 SERPL-SCNC: 29 MMOL/L (ref 21–32)
CREAT BLD-MCNC: 0.71 MG/DL
FASTING PATIENT LIPID ANSWER: YES
FASTING STATUS PATIENT QL REPORTED: YES
GLOBULIN PLAS-MCNC: 3.6 G/DL (ref 2.8–4.4)
GLUCOSE BLD-MCNC: 93 MG/DL (ref 70–99)
HDLC SERPL-MCNC: 81 MG/DL (ref 40–59)
LDLC SERPL CALC-MCNC: 55 MG/DL (ref ?–100)
NONHDLC SERPL-MCNC: 72 MG/DL (ref ?–130)
OSMOLALITY SERPL CALC.SUM OF ELEC: 285 MOSM/KG (ref 275–295)
POTASSIUM SERPL-SCNC: 3.7 MMOL/L (ref 3.5–5.1)
PROT SERPL-MCNC: 7.2 G/DL (ref 6.4–8.2)
SODIUM SERPL-SCNC: 138 MMOL/L (ref 136–145)
TRIGL SERPL-MCNC: 95 MG/DL (ref 30–149)
VLDLC SERPL CALC-MCNC: 14 MG/DL (ref 0–30)

## 2022-06-03 PROCEDURE — 36415 COLL VENOUS BLD VENIPUNCTURE: CPT

## 2022-06-03 PROCEDURE — 80053 COMPREHEN METABOLIC PANEL: CPT

## 2022-06-03 PROCEDURE — 80061 LIPID PANEL: CPT

## 2022-06-08 ENCOUNTER — OFFICE VISIT (OUTPATIENT)
Dept: FAMILY MEDICINE CLINIC | Facility: CLINIC | Age: 62
End: 2022-06-08
Payer: COMMERCIAL

## 2022-06-08 VITALS
TEMPERATURE: 98 F | HEART RATE: 70 BPM | HEIGHT: 58 IN | RESPIRATION RATE: 16 BRPM | BODY MASS INDEX: 32.32 KG/M2 | WEIGHT: 154 LBS

## 2022-06-08 DIAGNOSIS — I70.0 THORACIC AORTIC ATHEROSCLEROSIS (HCC): ICD-10-CM

## 2022-06-08 DIAGNOSIS — R19.5 MUCUS IN STOOL: ICD-10-CM

## 2022-06-08 DIAGNOSIS — R19.4 CHANGE IN STOOL HABITS: ICD-10-CM

## 2022-06-08 DIAGNOSIS — E66.09 CLASS 1 OBESITY DUE TO EXCESS CALORIES WITHOUT SERIOUS COMORBIDITY WITH BODY MASS INDEX (BMI) OF 32.0 TO 32.9 IN ADULT: ICD-10-CM

## 2022-06-08 DIAGNOSIS — K52.9 CHRONIC DIARRHEA: ICD-10-CM

## 2022-06-08 DIAGNOSIS — E78.2 MIXED HYPERLIPIDEMIA: Primary | ICD-10-CM

## 2022-06-08 DIAGNOSIS — Z12.31 ENCOUNTER FOR SCREENING MAMMOGRAM FOR MALIGNANT NEOPLASM OF BREAST: ICD-10-CM

## 2022-06-08 DIAGNOSIS — I10 ESSENTIAL HYPERTENSION: ICD-10-CM

## 2022-06-08 PROCEDURE — 99214 OFFICE O/P EST MOD 30 MIN: CPT | Performed by: STUDENT IN AN ORGANIZED HEALTH CARE EDUCATION/TRAINING PROGRAM

## 2022-06-08 PROCEDURE — 3008F BODY MASS INDEX DOCD: CPT | Performed by: STUDENT IN AN ORGANIZED HEALTH CARE EDUCATION/TRAINING PROGRAM

## 2022-06-08 RX ORDER — MULTIVITAMIN WITH IRON
250 TABLET ORAL DAILY
COMMUNITY

## 2022-06-08 RX ORDER — ROSUVASTATIN CALCIUM 5 MG/1
5 TABLET, COATED ORAL NIGHTLY
Qty: 90 TABLET | Refills: 3 | Status: SHIPPED | OUTPATIENT
Start: 2022-06-08

## 2022-06-08 RX ORDER — IRBESARTAN 150 MG/1
150 TABLET ORAL NIGHTLY
Qty: 90 TABLET | Refills: 1 | Status: SHIPPED | OUTPATIENT
Start: 2022-06-08

## 2022-06-08 RX ORDER — POTASSIUM CHLORIDE 750 MG/1
20 TABLET, FILM COATED, EXTENDED RELEASE ORAL DAILY
Qty: 180 TABLET | Refills: 1 | Status: SHIPPED | OUTPATIENT
Start: 2022-06-08

## 2022-06-08 RX ORDER — HYDROCHLOROTHIAZIDE 12.5 MG/1
12.5 TABLET ORAL DAILY
Qty: 90 TABLET | Refills: 1 | Status: SHIPPED | OUTPATIENT
Start: 2022-06-08

## 2022-08-19 ENCOUNTER — TELEPHONE (OUTPATIENT)
Dept: CASE MANAGEMENT | Age: 62
End: 2022-08-19

## 2022-08-19 DIAGNOSIS — Z12.11 COLON CANCER SCREENING: Primary | ICD-10-CM

## 2022-10-11 ENCOUNTER — PATIENT MESSAGE (OUTPATIENT)
Dept: FAMILY MEDICINE CLINIC | Facility: CLINIC | Age: 62
End: 2022-10-11

## 2022-10-11 ENCOUNTER — TELEPHONE (OUTPATIENT)
Dept: FAMILY MEDICINE CLINIC | Facility: CLINIC | Age: 62
End: 2022-10-11

## 2022-10-11 DIAGNOSIS — Z13.89 SCREENING FOR GENITOURINARY CONDITION: Primary | ICD-10-CM

## 2022-10-11 DIAGNOSIS — E78.00 HYPERCHOLESTEROLEMIA: ICD-10-CM

## 2022-10-11 DIAGNOSIS — Z00.00 BLOOD TESTS FOR ROUTINE GENERAL PHYSICAL EXAMINATION: ICD-10-CM

## 2022-10-11 DIAGNOSIS — E78.2 MIXED HYPERLIPIDEMIA: ICD-10-CM

## 2022-10-11 DIAGNOSIS — I10 ESSENTIAL HYPERTENSION: ICD-10-CM

## 2022-10-11 PROCEDURE — 82274 ASSAY TEST FOR BLOOD FECAL: CPT | Performed by: STUDENT IN AN ORGANIZED HEALTH CARE EDUCATION/TRAINING PROGRAM

## 2022-10-11 NOTE — TELEPHONE ENCOUNTER
Pt called to request an order for complete blood work. Pt's appt is scheduled for 11/7/2022. Please assure orders are in place.

## 2022-10-11 NOTE — TELEPHONE ENCOUNTER
Call to pt-advised orders for fasting blood work and urinalysis placed, to be done the wk before upcoming appt. Patient voices understanding/agrees with plan/no further questions.

## 2022-10-11 NOTE — TELEPHONE ENCOUNTER
Dr. Marisa Jaquez, the pt would like to receive the following at her upcoming physical on 11/07/2022:    Hep B, Flu and the Pneumonia vaccine.     Please advise

## 2022-10-11 NOTE — TELEPHONE ENCOUNTER
From: Rock Calles  To: Iram Lynne MD  Sent: 10/11/2022 9:17 AM CDT  Subject: HEPB, Flu and Pneumonia Vaccines please at next appointment     I have an annual physical scheduled November 7 and would like HEPB, Flu and pneumonia vaccines while there. \"One stop shopping\"! Happy David Vasquez!

## 2022-10-12 NOTE — TELEPHONE ENCOUNTER
I don't see that she needs Hep B vaccine, but she is due for shingles. So we could due flu, pneumonia, shingles vaccine if she is interested. Shingles vaccine can cause flu like symptoms and sore arm. If she is still interested in Hep B we can check Hep B profile to determine if she would need Hep B vaccine. Thank you.

## 2022-10-17 LAB — HEMOCCULT STL QL: NEGATIVE

## 2022-10-31 ENCOUNTER — HOSPITAL ENCOUNTER (OUTPATIENT)
Dept: MAMMOGRAPHY | Age: 62
Discharge: HOME | End: 2022-10-31
Attending: STUDENT IN AN ORGANIZED HEALTH CARE EDUCATION/TRAINING PROGRAM
Payer: COMMERCIAL

## 2022-10-31 DIAGNOSIS — Z12.31 ENCOUNTER FOR SCREENING MAMMOGRAM FOR MALIGNANT NEOPLASM OF BREAST: ICD-10-CM

## 2022-10-31 PROCEDURE — 77067 SCR MAMMO BI INCL CAD: CPT | Performed by: STUDENT IN AN ORGANIZED HEALTH CARE EDUCATION/TRAINING PROGRAM

## 2022-10-31 PROCEDURE — 77063 BREAST TOMOSYNTHESIS BI: CPT | Performed by: STUDENT IN AN ORGANIZED HEALTH CARE EDUCATION/TRAINING PROGRAM

## 2022-11-12 ENCOUNTER — LAB ENCOUNTER (OUTPATIENT)
Dept: LAB | Facility: HOSPITAL | Age: 62
End: 2022-11-12
Attending: STUDENT IN AN ORGANIZED HEALTH CARE EDUCATION/TRAINING PROGRAM
Payer: COMMERCIAL

## 2022-11-12 DIAGNOSIS — I10 ESSENTIAL HYPERTENSION: ICD-10-CM

## 2022-11-12 DIAGNOSIS — Z13.89 SCREENING FOR GENITOURINARY CONDITION: ICD-10-CM

## 2022-11-12 DIAGNOSIS — Z00.00 BLOOD TESTS FOR ROUTINE GENERAL PHYSICAL EXAMINATION: ICD-10-CM

## 2022-11-12 DIAGNOSIS — E78.00 HYPERCHOLESTEROLEMIA: ICD-10-CM

## 2022-11-12 DIAGNOSIS — E78.2 MIXED HYPERLIPIDEMIA: ICD-10-CM

## 2022-11-12 LAB
ALBUMIN SERPL-MCNC: 3.7 G/DL (ref 3.4–5)
ALBUMIN/GLOB SERPL: 1 {RATIO} (ref 1–2)
ALP LIVER SERPL-CCNC: 72 U/L
ALT SERPL-CCNC: 32 U/L
ANION GAP SERPL CALC-SCNC: 4 MMOL/L (ref 0–18)
AST SERPL-CCNC: 19 U/L (ref 15–37)
BASOPHILS # BLD AUTO: 0.06 X10(3) UL (ref 0–0.2)
BASOPHILS NFR BLD AUTO: 1.8 %
BILIRUB SERPL-MCNC: 0.5 MG/DL (ref 0.1–2)
BILIRUB UR QL STRIP.AUTO: NEGATIVE
BUN BLD-MCNC: 11 MG/DL (ref 7–18)
CALCIUM BLD-MCNC: 9.5 MG/DL (ref 8.5–10.1)
CHLORIDE SERPL-SCNC: 104 MMOL/L (ref 98–112)
CHOLEST SERPL-MCNC: 237 MG/DL (ref ?–200)
CLARITY UR REFRACT.AUTO: CLEAR
CO2 SERPL-SCNC: 29 MMOL/L (ref 21–32)
COLOR UR AUTO: YELLOW
CREAT BLD-MCNC: 0.74 MG/DL
EOSINOPHIL # BLD AUTO: 0.1 X10(3) UL (ref 0–0.7)
EOSINOPHIL NFR BLD AUTO: 2.9 %
ERYTHROCYTE [DISTWIDTH] IN BLOOD BY AUTOMATED COUNT: 12.5 %
FASTING PATIENT LIPID ANSWER: YES
FASTING STATUS PATIENT QL REPORTED: YES
GFR SERPLBLD BASED ON 1.73 SQ M-ARVRAT: 91 ML/MIN/1.73M2 (ref 60–?)
GLOBULIN PLAS-MCNC: 3.8 G/DL (ref 2.8–4.4)
GLUCOSE BLD-MCNC: 94 MG/DL (ref 70–99)
GLUCOSE UR STRIP.AUTO-MCNC: NEGATIVE MG/DL
HCT VFR BLD AUTO: 38.5 %
HDLC SERPL-MCNC: 81 MG/DL (ref 40–59)
HGB BLD-MCNC: 12.3 G/DL
IMM GRANULOCYTES # BLD AUTO: 0 X10(3) UL (ref 0–1)
IMM GRANULOCYTES NFR BLD: 0 %
KETONES UR STRIP.AUTO-MCNC: NEGATIVE MG/DL
LDLC SERPL CALC-MCNC: 138 MG/DL (ref ?–100)
LEUKOCYTE ESTERASE UR QL STRIP.AUTO: NEGATIVE
LYMPHOCYTES # BLD AUTO: 1.23 X10(3) UL (ref 1–4)
LYMPHOCYTES NFR BLD AUTO: 36.2 %
MCH RBC QN AUTO: 29.8 PG (ref 26–34)
MCHC RBC AUTO-ENTMCNC: 31.9 G/DL (ref 31–37)
MCV RBC AUTO: 93.2 FL
MONOCYTES # BLD AUTO: 0.35 X10(3) UL (ref 0.1–1)
MONOCYTES NFR BLD AUTO: 10.3 %
NEUTROPHILS # BLD AUTO: 1.66 X10 (3) UL (ref 1.5–7.7)
NEUTROPHILS # BLD AUTO: 1.66 X10(3) UL (ref 1.5–7.7)
NEUTROPHILS NFR BLD AUTO: 48.8 %
NITRITE UR QL STRIP.AUTO: NEGATIVE
NONHDLC SERPL-MCNC: 156 MG/DL (ref ?–130)
OSMOLALITY SERPL CALC.SUM OF ELEC: 283 MOSM/KG (ref 275–295)
PH UR STRIP.AUTO: 6 [PH] (ref 5–8)
PLATELET # BLD AUTO: 299 10(3)UL (ref 150–450)
POTASSIUM SERPL-SCNC: 3.9 MMOL/L (ref 3.5–5.1)
PROT SERPL-MCNC: 7.5 G/DL (ref 6.4–8.2)
PROT UR STRIP.AUTO-MCNC: NEGATIVE MG/DL
RBC # BLD AUTO: 4.13 X10(6)UL
RBC UR QL AUTO: NEGATIVE
SODIUM SERPL-SCNC: 137 MMOL/L (ref 136–145)
SP GR UR STRIP.AUTO: 1.01 (ref 1–1.03)
TRIGL SERPL-MCNC: 106 MG/DL (ref 30–149)
TSI SER-ACNC: 2.57 MIU/ML (ref 0.36–3.74)
UROBILINOGEN UR STRIP.AUTO-MCNC: <2 MG/DL
VLDLC SERPL CALC-MCNC: 19 MG/DL (ref 0–30)
WBC # BLD AUTO: 3.4 X10(3) UL (ref 4–11)

## 2022-11-12 PROCEDURE — 80061 LIPID PANEL: CPT

## 2022-11-12 PROCEDURE — 80053 COMPREHEN METABOLIC PANEL: CPT

## 2022-11-12 PROCEDURE — 84443 ASSAY THYROID STIM HORMONE: CPT

## 2022-11-12 PROCEDURE — 85025 COMPLETE CBC W/AUTO DIFF WBC: CPT

## 2022-11-12 PROCEDURE — 36415 COLL VENOUS BLD VENIPUNCTURE: CPT

## 2022-11-12 PROCEDURE — 81003 URINALYSIS AUTO W/O SCOPE: CPT

## 2022-11-16 ENCOUNTER — OFFICE VISIT (OUTPATIENT)
Dept: FAMILY MEDICINE CLINIC | Facility: CLINIC | Age: 62
End: 2022-11-16
Payer: COMMERCIAL

## 2022-11-16 VITALS — WEIGHT: 152 LBS | BODY MASS INDEX: 31.48 KG/M2 | HEIGHT: 58.25 IN

## 2022-11-16 DIAGNOSIS — I70.0 THORACIC AORTIC ATHEROSCLEROSIS (HCC): ICD-10-CM

## 2022-11-16 DIAGNOSIS — E78.2 MIXED HYPERLIPIDEMIA: ICD-10-CM

## 2022-11-16 DIAGNOSIS — E66.09 CLASS 1 OBESITY DUE TO EXCESS CALORIES WITHOUT SERIOUS COMORBIDITY WITH BODY MASS INDEX (BMI) OF 31.0 TO 31.9 IN ADULT: ICD-10-CM

## 2022-11-16 DIAGNOSIS — Z00.00 WELLNESS EXAMINATION: Primary | ICD-10-CM

## 2022-11-16 DIAGNOSIS — K52.9 CHRONIC DIARRHEA: ICD-10-CM

## 2022-11-16 DIAGNOSIS — Z23 NEED FOR VACCINATION: ICD-10-CM

## 2022-11-16 DIAGNOSIS — I10 ESSENTIAL HYPERTENSION: ICD-10-CM

## 2022-11-16 PROCEDURE — 90686 IIV4 VACC NO PRSV 0.5 ML IM: CPT | Performed by: STUDENT IN AN ORGANIZED HEALTH CARE EDUCATION/TRAINING PROGRAM

## 2022-11-16 PROCEDURE — 90471 IMMUNIZATION ADMIN: CPT | Performed by: STUDENT IN AN ORGANIZED HEALTH CARE EDUCATION/TRAINING PROGRAM

## 2022-11-16 PROCEDURE — 99396 PREV VISIT EST AGE 40-64: CPT | Performed by: STUDENT IN AN ORGANIZED HEALTH CARE EDUCATION/TRAINING PROGRAM

## 2022-11-16 PROCEDURE — 3008F BODY MASS INDEX DOCD: CPT | Performed by: STUDENT IN AN ORGANIZED HEALTH CARE EDUCATION/TRAINING PROGRAM

## 2022-11-16 PROCEDURE — 99214 OFFICE O/P EST MOD 30 MIN: CPT | Performed by: STUDENT IN AN ORGANIZED HEALTH CARE EDUCATION/TRAINING PROGRAM

## 2022-11-16 RX ORDER — SIMVASTATIN 5 MG
5 TABLET ORAL NIGHTLY
Qty: 90 TABLET | Refills: 0 | Status: SHIPPED | OUTPATIENT
Start: 2022-11-16

## 2022-11-18 ENCOUNTER — LAB ENCOUNTER (OUTPATIENT)
Dept: LAB | Facility: HOSPITAL | Age: 62
End: 2022-11-18
Attending: STUDENT IN AN ORGANIZED HEALTH CARE EDUCATION/TRAINING PROGRAM
Payer: COMMERCIAL

## 2022-11-18 DIAGNOSIS — K52.9 CHRONIC DIARRHEA: ICD-10-CM

## 2022-11-18 LAB
ERYTHROCYTE [SEDIMENTATION RATE] IN BLOOD: 15 MM/HR
IGA SERPL-MCNC: 172 MG/DL (ref 70–312)

## 2022-11-18 PROCEDURE — 83993 ASSAY FOR CALPROTECTIN FECAL: CPT

## 2022-11-18 PROCEDURE — 86364 TISS TRNSGLTMNASE EA IG CLAS: CPT

## 2022-11-18 PROCEDURE — 85652 RBC SED RATE AUTOMATED: CPT

## 2022-11-18 PROCEDURE — 36415 COLL VENOUS BLD VENIPUNCTURE: CPT

## 2022-11-21 DIAGNOSIS — R19.5 ELEVATED FECAL CALPROTECTIN: ICD-10-CM

## 2022-11-21 DIAGNOSIS — K52.9 CHRONIC DIARRHEA: Primary | ICD-10-CM

## 2022-11-21 LAB — CALPROTECTIN STL-MCNT: 126 ΜG/G (ref ?–50)

## 2022-11-22 LAB — TTG IGA SER-ACNC: 0.7 U/ML (ref ?–7)

## 2022-12-24 DIAGNOSIS — I10 ESSENTIAL HYPERTENSION: ICD-10-CM

## 2022-12-27 RX ORDER — POTASSIUM CHLORIDE 750 MG/1
TABLET, FILM COATED, EXTENDED RELEASE ORAL
Qty: 180 TABLET | Refills: 0 | Status: SHIPPED | OUTPATIENT
Start: 2022-12-27

## 2022-12-27 RX ORDER — IRBESARTAN 150 MG/1
TABLET ORAL
Qty: 90 TABLET | Refills: 0 | Status: SHIPPED | OUTPATIENT
Start: 2022-12-27

## 2022-12-27 RX ORDER — HYDROCHLOROTHIAZIDE 12.5 MG/1
TABLET ORAL
Qty: 90 TABLET | Refills: 0 | Status: SHIPPED | OUTPATIENT
Start: 2022-12-27

## 2022-12-27 NOTE — TELEPHONE ENCOUNTER
LOV: 11/16/22 (CPX)     Last Refill:   Potassium Chloride ER 10 MEQ Oral Tab CR, 6/8/22, #180, 1 RF    Irbesartan 150 MG Oral Tab, 6/8/22, #90, 1 RF    hydroCHLOROthiazide 12.5 MG Oral Tab, 6/8/22, #90, 1 RF    Next OV: n/a    Please authorize if acceptable. Thank you!

## 2023-02-03 DIAGNOSIS — E78.2 MIXED HYPERLIPIDEMIA: ICD-10-CM

## 2023-02-03 RX ORDER — SIMVASTATIN 5 MG
TABLET ORAL
Qty: 90 TABLET | Refills: 0 | Status: SHIPPED | OUTPATIENT
Start: 2023-02-03

## 2023-02-23 ENCOUNTER — TELEPHONE (OUTPATIENT)
Dept: FAMILY MEDICINE CLINIC | Facility: CLINIC | Age: 63
End: 2023-02-23

## 2023-02-23 NOTE — TELEPHONE ENCOUNTER
Received correspondence from Dr. Mo Todd regarding possible microscopic colitis from statin use. Given that patient's symptoms started after starting a statin, this is something we can consider. We should follow up to discuss.

## 2023-03-24 ENCOUNTER — LAB ENCOUNTER (OUTPATIENT)
Dept: LAB | Facility: HOSPITAL | Age: 63
End: 2023-03-24
Attending: INTERNAL MEDICINE
Payer: COMMERCIAL

## 2023-03-24 DIAGNOSIS — K52.9 CHRONIC DIARRHEA: ICD-10-CM

## 2023-03-24 LAB
CRYPTOSP AG STL QL IA: NEGATIVE
G LAMBLIA AG STL QL IA: NEGATIVE

## 2023-03-24 PROCEDURE — 87329 GIARDIA AG IA: CPT

## 2023-03-24 PROCEDURE — 87046 STOOL CULTR AEROBIC BACT EA: CPT

## 2023-03-24 PROCEDURE — 87045 FECES CULTURE AEROBIC BACT: CPT

## 2023-03-24 PROCEDURE — 87272 CRYPTOSPORIDIUM AG IF: CPT

## 2023-03-24 PROCEDURE — 87493 C DIFF AMPLIFIED PROBE: CPT

## 2023-03-24 PROCEDURE — 87427 SHIGA-LIKE TOXIN AG IA: CPT

## 2023-03-24 PROCEDURE — 82656 EL-1 FECAL QUAL/SEMIQ: CPT

## 2023-03-25 ENCOUNTER — LAB ENCOUNTER (OUTPATIENT)
Dept: LAB | Facility: HOSPITAL | Age: 63
End: 2023-03-25
Attending: STUDENT IN AN ORGANIZED HEALTH CARE EDUCATION/TRAINING PROGRAM
Payer: COMMERCIAL

## 2023-03-25 DIAGNOSIS — I70.0 THORACIC AORTIC ATHEROSCLEROSIS (HCC): ICD-10-CM

## 2023-03-25 DIAGNOSIS — E78.2 MIXED HYPERLIPIDEMIA: ICD-10-CM

## 2023-03-25 LAB
C DIFF TOX B STL QL: NEGATIVE
CHOLEST SERPL-MCNC: 176 MG/DL (ref ?–200)
FASTING PATIENT LIPID ANSWER: YES
HDLC SERPL-MCNC: 92 MG/DL (ref 40–59)
LDLC SERPL CALC-MCNC: 71 MG/DL (ref ?–100)
NONHDLC SERPL-MCNC: 84 MG/DL (ref ?–130)
TRIGL SERPL-MCNC: 68 MG/DL (ref 30–149)
VLDLC SERPL CALC-MCNC: 10 MG/DL (ref 0–30)

## 2023-03-25 PROCEDURE — 36415 COLL VENOUS BLD VENIPUNCTURE: CPT

## 2023-03-25 PROCEDURE — 80061 LIPID PANEL: CPT

## 2023-03-27 ENCOUNTER — OFFICE VISIT (OUTPATIENT)
Dept: FAMILY MEDICINE CLINIC | Facility: CLINIC | Age: 63
End: 2023-03-27
Payer: COMMERCIAL

## 2023-03-27 VITALS
WEIGHT: 154 LBS | DIASTOLIC BLOOD PRESSURE: 89 MMHG | SYSTOLIC BLOOD PRESSURE: 144 MMHG | TEMPERATURE: 98 F | BODY MASS INDEX: 31.46 KG/M2 | RESPIRATION RATE: 16 BRPM | HEART RATE: 76 BPM | HEIGHT: 58.5 IN

## 2023-03-27 DIAGNOSIS — I10 ESSENTIAL HYPERTENSION: ICD-10-CM

## 2023-03-27 DIAGNOSIS — E78.2 MIXED HYPERLIPIDEMIA: Primary | ICD-10-CM

## 2023-03-27 PROCEDURE — 3008F BODY MASS INDEX DOCD: CPT | Performed by: STUDENT IN AN ORGANIZED HEALTH CARE EDUCATION/TRAINING PROGRAM

## 2023-03-27 PROCEDURE — 3079F DIAST BP 80-89 MM HG: CPT | Performed by: STUDENT IN AN ORGANIZED HEALTH CARE EDUCATION/TRAINING PROGRAM

## 2023-03-27 PROCEDURE — 99214 OFFICE O/P EST MOD 30 MIN: CPT | Performed by: STUDENT IN AN ORGANIZED HEALTH CARE EDUCATION/TRAINING PROGRAM

## 2023-03-27 PROCEDURE — 3077F SYST BP >= 140 MM HG: CPT | Performed by: STUDENT IN AN ORGANIZED HEALTH CARE EDUCATION/TRAINING PROGRAM

## 2023-03-27 RX ORDER — EZETIMIBE 10 MG/1
10 TABLET ORAL NIGHTLY
Qty: 90 TABLET | Refills: 0 | Status: SHIPPED | OUTPATIENT
Start: 2023-03-27

## 2023-03-27 RX ORDER — SODIUM, POTASSIUM,MAG SULFATES 17.5-3.13G
SOLUTION, RECONSTITUTED, ORAL ORAL
COMMUNITY
Start: 2023-02-21

## 2023-03-27 RX ORDER — IRBESARTAN 300 MG/1
300 TABLET ORAL NIGHTLY
Qty: 90 TABLET | Refills: 0 | Status: SHIPPED | OUTPATIENT
Start: 2023-03-27

## 2023-03-28 LAB — PANCREATIC ELASTASE , FECAL: 215 UG/G

## 2023-06-27 ENCOUNTER — LAB ENCOUNTER (OUTPATIENT)
Dept: LAB | Facility: HOSPITAL | Age: 63
End: 2023-06-27
Attending: STUDENT IN AN ORGANIZED HEALTH CARE EDUCATION/TRAINING PROGRAM
Payer: COMMERCIAL

## 2023-06-27 DIAGNOSIS — I10 ESSENTIAL HYPERTENSION: ICD-10-CM

## 2023-06-27 DIAGNOSIS — E78.2 MIXED HYPERLIPIDEMIA: ICD-10-CM

## 2023-06-27 LAB
ALBUMIN SERPL-MCNC: 3.7 G/DL (ref 3.4–5)
ALBUMIN/GLOB SERPL: 1.1 {RATIO} (ref 1–2)
ALP LIVER SERPL-CCNC: 75 U/L
ALT SERPL-CCNC: 28 U/L
ANION GAP SERPL CALC-SCNC: 3 MMOL/L (ref 0–18)
AST SERPL-CCNC: 17 U/L (ref 15–37)
BILIRUB SERPL-MCNC: 0.3 MG/DL (ref 0.1–2)
BUN BLD-MCNC: 10 MG/DL (ref 7–18)
CALCIUM BLD-MCNC: 9.3 MG/DL (ref 8.5–10.1)
CHLORIDE SERPL-SCNC: 108 MMOL/L (ref 98–112)
CHOLEST SERPL-MCNC: 173 MG/DL (ref ?–200)
CO2 SERPL-SCNC: 30 MMOL/L (ref 21–32)
CREAT BLD-MCNC: 0.67 MG/DL
FASTING PATIENT LIPID ANSWER: YES
FASTING STATUS PATIENT QL REPORTED: YES
GFR SERPLBLD BASED ON 1.73 SQ M-ARVRAT: 99 ML/MIN/1.73M2 (ref 60–?)
GLOBULIN PLAS-MCNC: 3.4 G/DL (ref 2.8–4.4)
GLUCOSE BLD-MCNC: 86 MG/DL (ref 70–99)
HDLC SERPL-MCNC: 75 MG/DL (ref 40–59)
LDLC SERPL CALC-MCNC: 78 MG/DL (ref ?–100)
NONHDLC SERPL-MCNC: 98 MG/DL (ref ?–130)
OSMOLALITY SERPL CALC.SUM OF ELEC: 290 MOSM/KG (ref 275–295)
POTASSIUM SERPL-SCNC: 3.6 MMOL/L (ref 3.5–5.1)
PROT SERPL-MCNC: 7.1 G/DL (ref 6.4–8.2)
SODIUM SERPL-SCNC: 141 MMOL/L (ref 136–145)
TRIGL SERPL-MCNC: 113 MG/DL (ref 30–149)
VLDLC SERPL CALC-MCNC: 18 MG/DL (ref 0–30)

## 2023-06-27 PROCEDURE — 36415 COLL VENOUS BLD VENIPUNCTURE: CPT

## 2023-06-27 PROCEDURE — 80061 LIPID PANEL: CPT

## 2023-06-27 PROCEDURE — 80053 COMPREHEN METABOLIC PANEL: CPT

## 2023-06-28 ENCOUNTER — OFFICE VISIT (OUTPATIENT)
Dept: FAMILY MEDICINE CLINIC | Facility: CLINIC | Age: 63
End: 2023-06-28
Payer: COMMERCIAL

## 2023-06-28 VITALS
HEART RATE: 90 BPM | DIASTOLIC BLOOD PRESSURE: 68 MMHG | WEIGHT: 154 LBS | HEIGHT: 58.5 IN | TEMPERATURE: 98 F | SYSTOLIC BLOOD PRESSURE: 122 MMHG | RESPIRATION RATE: 16 BRPM | BODY MASS INDEX: 31.46 KG/M2

## 2023-06-28 DIAGNOSIS — K52.9 CHRONIC DIARRHEA: ICD-10-CM

## 2023-06-28 DIAGNOSIS — I10 ESSENTIAL HYPERTENSION: Primary | ICD-10-CM

## 2023-06-28 DIAGNOSIS — E78.2 MIXED HYPERLIPIDEMIA: ICD-10-CM

## 2023-06-28 DIAGNOSIS — Z00.00 LABORATORY EXAM ORDERED AS PART OF ROUTINE GENERAL MEDICAL EXAMINATION: ICD-10-CM

## 2023-06-28 PROCEDURE — 3008F BODY MASS INDEX DOCD: CPT | Performed by: STUDENT IN AN ORGANIZED HEALTH CARE EDUCATION/TRAINING PROGRAM

## 2023-06-28 PROCEDURE — 3078F DIAST BP <80 MM HG: CPT | Performed by: STUDENT IN AN ORGANIZED HEALTH CARE EDUCATION/TRAINING PROGRAM

## 2023-06-28 PROCEDURE — 99214 OFFICE O/P EST MOD 30 MIN: CPT | Performed by: STUDENT IN AN ORGANIZED HEALTH CARE EDUCATION/TRAINING PROGRAM

## 2023-06-28 PROCEDURE — 3074F SYST BP LT 130 MM HG: CPT | Performed by: STUDENT IN AN ORGANIZED HEALTH CARE EDUCATION/TRAINING PROGRAM

## 2023-06-28 RX ORDER — IRBESARTAN 300 MG/1
300 TABLET ORAL NIGHTLY
Qty: 90 TABLET | Refills: 1 | Status: SHIPPED | OUTPATIENT
Start: 2023-06-28

## 2023-06-28 RX ORDER — HYDROCHLOROTHIAZIDE 12.5 MG/1
12.5 TABLET ORAL DAILY
Qty: 90 TABLET | Refills: 1 | Status: SHIPPED | OUTPATIENT
Start: 2023-06-28

## 2023-06-28 RX ORDER — EZETIMIBE 10 MG/1
10 TABLET ORAL NIGHTLY
Qty: 90 TABLET | Refills: 1 | Status: SHIPPED | OUTPATIENT
Start: 2023-06-28

## 2023-07-10 PROBLEM — R19.7 DIARRHEA, UNSPECIFIED: Status: ACTIVE | Noted: 2023-07-10

## 2023-07-10 PROBLEM — R10.11 ABDOMINAL PAIN, RIGHT UPPER QUADRANT: Status: ACTIVE | Noted: 2023-07-10

## 2023-07-15 ENCOUNTER — PATIENT MESSAGE (OUTPATIENT)
Dept: FAMILY MEDICINE CLINIC | Facility: CLINIC | Age: 63
End: 2023-07-15

## 2023-08-12 ENCOUNTER — LAB ENCOUNTER (OUTPATIENT)
Dept: LAB | Facility: HOSPITAL | Age: 63
End: 2023-08-12
Attending: INTERNAL MEDICINE
Payer: COMMERCIAL

## 2023-08-12 DIAGNOSIS — R10.11 ABDOMINAL PAIN, RIGHT UPPER QUADRANT: ICD-10-CM

## 2023-08-12 DIAGNOSIS — R19.7 DIARRHEA, UNSPECIFIED TYPE: ICD-10-CM

## 2023-08-12 LAB
ALBUMIN SERPL-MCNC: 3.8 G/DL (ref 3.4–5)
ALBUMIN/GLOB SERPL: 1.2 {RATIO} (ref 1–2)
ALP LIVER SERPL-CCNC: 83 U/L
ALT SERPL-CCNC: 27 U/L
ANION GAP SERPL CALC-SCNC: 2 MMOL/L (ref 0–18)
AST SERPL-CCNC: 19 U/L (ref 15–37)
BASOPHILS # BLD AUTO: 0.07 X10(3) UL (ref 0–0.2)
BASOPHILS NFR BLD AUTO: 1.9 %
BILIRUB SERPL-MCNC: 0.4 MG/DL (ref 0.1–2)
BUN BLD-MCNC: 12 MG/DL (ref 7–18)
CALCIUM BLD-MCNC: 9.3 MG/DL (ref 8.5–10.1)
CHLORIDE SERPL-SCNC: 106 MMOL/L (ref 98–112)
CO2 SERPL-SCNC: 29 MMOL/L (ref 21–32)
CREAT BLD-MCNC: 0.72 MG/DL
CRP SERPL-MCNC: <0.29 MG/DL (ref ?–0.3)
DEPRECATED HBV CORE AB SER IA-ACNC: 10.4 NG/ML
EGFRCR SERPLBLD CKD-EPI 2021: 94 ML/MIN/1.73M2 (ref 60–?)
EOSINOPHIL # BLD AUTO: 0.07 X10(3) UL (ref 0–0.7)
EOSINOPHIL NFR BLD AUTO: 1.9 %
ERYTHROCYTE [DISTWIDTH] IN BLOOD BY AUTOMATED COUNT: 13 %
FASTING STATUS PATIENT QL REPORTED: YES
GLOBULIN PLAS-MCNC: 3.3 G/DL (ref 2.8–4.4)
GLUCOSE BLD-MCNC: 82 MG/DL (ref 70–99)
HCT VFR BLD AUTO: 36.9 %
HGB BLD-MCNC: 12.2 G/DL
IMM GRANULOCYTES # BLD AUTO: 0 X10(3) UL (ref 0–1)
IMM GRANULOCYTES NFR BLD: 0 %
IRON SATN MFR SERPL: 24 %
IRON SERPL-MCNC: 101 UG/DL
LYMPHOCYTES # BLD AUTO: 1.13 X10(3) UL (ref 1–4)
LYMPHOCYTES NFR BLD AUTO: 30.1 %
MCH RBC QN AUTO: 29.5 PG (ref 26–34)
MCHC RBC AUTO-ENTMCNC: 33.1 G/DL (ref 31–37)
MCV RBC AUTO: 89.1 FL
MONOCYTES # BLD AUTO: 0.3 X10(3) UL (ref 0.1–1)
MONOCYTES NFR BLD AUTO: 8 %
NEUTROPHILS # BLD AUTO: 2.18 X10 (3) UL (ref 1.5–7.7)
NEUTROPHILS # BLD AUTO: 2.18 X10(3) UL (ref 1.5–7.7)
NEUTROPHILS NFR BLD AUTO: 58.1 %
OSMOLALITY SERPL CALC.SUM OF ELEC: 283 MOSM/KG (ref 275–295)
PLATELET # BLD AUTO: 278 10(3)UL (ref 150–450)
POTASSIUM SERPL-SCNC: 3.7 MMOL/L (ref 3.5–5.1)
PROT SERPL-MCNC: 7.1 G/DL (ref 6.4–8.2)
RBC # BLD AUTO: 4.14 X10(6)UL
SODIUM SERPL-SCNC: 137 MMOL/L (ref 136–145)
TIBC SERPL-MCNC: 416 UG/DL (ref 240–450)
TRANSFERRIN SERPL-MCNC: 279 MG/DL (ref 200–360)
WBC # BLD AUTO: 3.8 X10(3) UL (ref 4–11)

## 2023-08-12 PROCEDURE — 83550 IRON BINDING TEST: CPT

## 2023-08-12 PROCEDURE — 85025 COMPLETE CBC W/AUTO DIFF WBC: CPT

## 2023-08-12 PROCEDURE — 86140 C-REACTIVE PROTEIN: CPT

## 2023-08-12 PROCEDURE — 36415 COLL VENOUS BLD VENIPUNCTURE: CPT

## 2023-08-12 PROCEDURE — 82728 ASSAY OF FERRITIN: CPT

## 2023-08-12 PROCEDURE — 83540 ASSAY OF IRON: CPT

## 2023-08-12 PROCEDURE — 80053 COMPREHEN METABOLIC PANEL: CPT

## 2023-08-25 PROBLEM — D64.9 ANEMIA, UNSPECIFIED: Status: ACTIVE | Noted: 2023-08-25

## 2023-10-10 NOTE — PATIENT INSTRUCTIONS
Td today. Health screening: Mammogram recommended every 1-2 years after age 36. If you still have your cervix (if your uterus was not removed), Pap recommended every 5 years until age 72. HIV testing available for anyone.  Hepatitis C screening with blood t you can be watched more closely to reduce the risk of disease, or to detect it early enough to treat it most effectively. Screening tests are not considered diagnostic, but are used to determine if more testing is needed.  Health counseling is essential, to provider advises; talk with your health care provider about which tests are best for you   Depression All women in this age group At routine exams   Gonorrhea Sexually active women at increased risk for infection At routine exams   Hepatitis C Anyone at in no record of these infections or vaccines 1 dose   Meningococcal Women at increased risk for infection – talk with your healthcare provider 1 or more doses   Pneumococcal conjugate vaccine (PCV13) and pneumococcal polysaccharide vaccine (PPSV23) Women at i (2) well flexed

## 2023-10-12 ENCOUNTER — PATIENT MESSAGE (OUTPATIENT)
Dept: FAMILY MEDICINE CLINIC | Facility: CLINIC | Age: 63
End: 2023-10-12

## 2023-10-12 DIAGNOSIS — Z12.31 SCREENING MAMMOGRAM FOR BREAST CANCER: Primary | ICD-10-CM

## 2023-10-12 NOTE — TELEPHONE ENCOUNTER
From: Alcus Cuff  To: Niall Garcia  Sent: 10/12/2023 1:27 PM CDT  Subject: Mammogram,     Good afternoon. I received a mammogram reminder letter. . My mammograms are always normal. Do I need to schedule again this year? When do I need to come in for blood pressure check and flu shot? Thank you.   Alejandro Vasquez

## 2023-10-12 NOTE — TELEPHONE ENCOUNTER
Please see [facility] message and advise.   Screening mammogram pended for approval.

## 2023-10-13 NOTE — TELEPHONE ENCOUNTER
I do recommend a yearly screening mammogram. Patient will be due for physical and for medication follow up after November 16, 2023. If she'd like to schedule a flu shot with our flu clinic she is able to do that as well. I encourage patients to obtain the flu shot in October so patient has immunity prior to fall/winter holidays. Thank you.

## 2023-10-27 NOTE — PROGRESS NOTES
Marlen Smith IS A 62year old female [address] [place] Patient presents with:  Medication Request       History of present illness:     Some stress with grandson's school & behavioral issues, illness and sad events in friends or family life.      Has some chest p Rfl:    Calcium Carbonate Antacid (TUMS OR) Take 1 tablet by mouth daily. Disp:  Rfl:    GLUCOS-CHONDROIT-COLLAG-HYAL OR Take  by mouth.  Disp:  Rfl:        Allergy:      No Known Allergies    Family history:       Family History   Problem Relation Age of O 142/102   Pulse 92   Temp 98.6 °F (37 °C) (Oral)   Resp 20   Ht 58.5\"   Wt 159 lb   SpO2 98%   BMI 32.67 kg/m²      I repeated BP at 160/100. Lungs clear   Heart regular rhythm  Abdomen soft nontender  Extremities pulses normal no edema.     Test result Consent (Lip)/Introductory Paragraph: The rationale for Mohs was explained to the patient and consent was obtained. The risks, benefits and alternatives to therapy were discussed in detail. Specifically, the risks of lip deformity, changes in the oral aperture, infection, scarring, bleeding, prolonged wound healing, incomplete removal, allergy to anesthesia, nerve injury and recurrence were addressed. Prior to the procedure, the treatment site was clearly identified and confirmed by the patient. All components of Universal Protocol/PAUSE Rule completed.

## 2023-11-08 ENCOUNTER — HOSPITAL ENCOUNTER (OUTPATIENT)
Dept: MAMMOGRAPHY | Age: 63
Discharge: HOME | End: 2023-11-08
Attending: STUDENT IN AN ORGANIZED HEALTH CARE EDUCATION/TRAINING PROGRAM
Payer: COMMERCIAL

## 2023-11-08 DIAGNOSIS — Z12.31 SCREENING MAMMOGRAM FOR BREAST CANCER: ICD-10-CM

## 2023-11-08 PROCEDURE — 77063 BREAST TOMOSYNTHESIS BI: CPT | Performed by: STUDENT IN AN ORGANIZED HEALTH CARE EDUCATION/TRAINING PROGRAM

## 2023-11-08 PROCEDURE — 77067 SCR MAMMO BI INCL CAD: CPT | Performed by: STUDENT IN AN ORGANIZED HEALTH CARE EDUCATION/TRAINING PROGRAM

## 2024-03-02 ENCOUNTER — LAB ENCOUNTER (OUTPATIENT)
Dept: LAB | Facility: HOSPITAL | Age: 64
End: 2024-03-02
Attending: STUDENT IN AN ORGANIZED HEALTH CARE EDUCATION/TRAINING PROGRAM
Payer: COMMERCIAL

## 2024-03-02 DIAGNOSIS — E78.2 MIXED HYPERLIPIDEMIA: ICD-10-CM

## 2024-03-02 DIAGNOSIS — I10 ESSENTIAL HYPERTENSION: ICD-10-CM

## 2024-03-02 DIAGNOSIS — Z00.00 LABORATORY EXAM ORDERED AS PART OF ROUTINE GENERAL MEDICAL EXAMINATION: ICD-10-CM

## 2024-03-02 LAB
ALBUMIN SERPL-MCNC: 3.7 G/DL (ref 3.4–5)
ALBUMIN/GLOB SERPL: 1.1 {RATIO} (ref 1–2)
ALP LIVER SERPL-CCNC: 83 U/L
ALT SERPL-CCNC: 24 U/L
ANION GAP SERPL CALC-SCNC: 4 MMOL/L (ref 0–18)
AST SERPL-CCNC: 19 U/L (ref 15–37)
BASOPHILS # BLD AUTO: 0.06 X10(3) UL (ref 0–0.2)
BASOPHILS NFR BLD AUTO: 1.6 %
BILIRUB SERPL-MCNC: 0.4 MG/DL (ref 0.1–2)
BILIRUB UR QL STRIP.AUTO: NEGATIVE
BUN BLD-MCNC: 10 MG/DL (ref 9–23)
CALCIUM BLD-MCNC: 9.7 MG/DL (ref 8.5–10.1)
CHLORIDE SERPL-SCNC: 107 MMOL/L (ref 98–112)
CHOLEST SERPL-MCNC: 180 MG/DL (ref ?–200)
CLARITY UR REFRACT.AUTO: CLEAR
CO2 SERPL-SCNC: 29 MMOL/L (ref 21–32)
COLOR UR AUTO: COLORLESS
CREAT BLD-MCNC: 0.64 MG/DL
EGFRCR SERPLBLD CKD-EPI 2021: 99 ML/MIN/1.73M2 (ref 60–?)
EOSINOPHIL # BLD AUTO: 0.07 X10(3) UL (ref 0–0.7)
EOSINOPHIL NFR BLD AUTO: 1.9 %
ERYTHROCYTE [DISTWIDTH] IN BLOOD BY AUTOMATED COUNT: 12.8 %
FASTING PATIENT LIPID ANSWER: YES
FASTING STATUS PATIENT QL REPORTED: YES
GLOBULIN PLAS-MCNC: 3.3 G/DL (ref 2.8–4.4)
GLUCOSE BLD-MCNC: 83 MG/DL (ref 70–99)
GLUCOSE UR STRIP.AUTO-MCNC: NORMAL MG/DL
HCT VFR BLD AUTO: 35.7 %
HDLC SERPL-MCNC: 73 MG/DL (ref 40–59)
HGB BLD-MCNC: 11.4 G/DL
IMM GRANULOCYTES # BLD AUTO: 0.01 X10(3) UL (ref 0–1)
IMM GRANULOCYTES NFR BLD: 0.3 %
KETONES UR STRIP.AUTO-MCNC: NEGATIVE MG/DL
LDLC SERPL CALC-MCNC: 88 MG/DL (ref ?–100)
LEUKOCYTE ESTERASE UR QL STRIP.AUTO: NEGATIVE
LYMPHOCYTES # BLD AUTO: 1.18 X10(3) UL (ref 1–4)
LYMPHOCYTES NFR BLD AUTO: 31.2 %
MCH RBC QN AUTO: 29.3 PG (ref 26–34)
MCHC RBC AUTO-ENTMCNC: 31.9 G/DL (ref 31–37)
MCV RBC AUTO: 91.8 FL
MONOCYTES # BLD AUTO: 0.31 X10(3) UL (ref 0.1–1)
MONOCYTES NFR BLD AUTO: 8.2 %
NEUTROPHILS # BLD AUTO: 2.15 X10 (3) UL (ref 1.5–7.7)
NEUTROPHILS # BLD AUTO: 2.15 X10(3) UL (ref 1.5–7.7)
NEUTROPHILS NFR BLD AUTO: 56.8 %
NITRITE UR QL STRIP.AUTO: NEGATIVE
NONHDLC SERPL-MCNC: 107 MG/DL (ref ?–130)
OSMOLALITY SERPL CALC.SUM OF ELEC: 288 MOSM/KG (ref 275–295)
PH UR STRIP.AUTO: 6.5 [PH] (ref 5–8)
PLATELET # BLD AUTO: 286 10(3)UL (ref 150–450)
POTASSIUM SERPL-SCNC: 4.2 MMOL/L (ref 3.5–5.1)
PROT SERPL-MCNC: 7 G/DL (ref 6.4–8.2)
PROT UR STRIP.AUTO-MCNC: NEGATIVE MG/DL
RBC # BLD AUTO: 3.89 X10(6)UL
RBC UR QL AUTO: NEGATIVE
SODIUM SERPL-SCNC: 140 MMOL/L (ref 136–145)
SP GR UR STRIP.AUTO: 1 (ref 1–1.03)
T4 FREE SERPL-MCNC: 0.9 NG/DL (ref 0.8–1.7)
TRIGL SERPL-MCNC: 109 MG/DL (ref 30–149)
TSI SER-ACNC: 3.31 MIU/ML (ref 0.36–3.74)
UROBILINOGEN UR STRIP.AUTO-MCNC: NORMAL MG/DL
VLDLC SERPL CALC-MCNC: 17 MG/DL (ref 0–30)
WBC # BLD AUTO: 3.8 X10(3) UL (ref 4–11)

## 2024-03-02 PROCEDURE — 80061 LIPID PANEL: CPT

## 2024-03-02 PROCEDURE — 84443 ASSAY THYROID STIM HORMONE: CPT

## 2024-03-02 PROCEDURE — 80053 COMPREHEN METABOLIC PANEL: CPT

## 2024-03-02 PROCEDURE — 81003 URINALYSIS AUTO W/O SCOPE: CPT

## 2024-03-02 PROCEDURE — 85025 COMPLETE CBC W/AUTO DIFF WBC: CPT

## 2024-03-02 PROCEDURE — 36415 COLL VENOUS BLD VENIPUNCTURE: CPT

## 2024-03-02 PROCEDURE — 84439 ASSAY OF FREE THYROXINE: CPT

## 2024-03-07 ENCOUNTER — OFFICE VISIT (OUTPATIENT)
Dept: FAMILY MEDICINE CLINIC | Facility: CLINIC | Age: 64
End: 2024-03-07
Payer: COMMERCIAL

## 2024-03-07 VITALS
BODY MASS INDEX: 32.25 KG/M2 | HEIGHT: 57.87 IN | WEIGHT: 153.63 LBS | DIASTOLIC BLOOD PRESSURE: 80 MMHG | TEMPERATURE: 97 F | HEART RATE: 78 BPM | RESPIRATION RATE: 16 BRPM | SYSTOLIC BLOOD PRESSURE: 132 MMHG

## 2024-03-07 DIAGNOSIS — I10 ESSENTIAL HYPERTENSION: ICD-10-CM

## 2024-03-07 DIAGNOSIS — E78.2 MIXED HYPERLIPIDEMIA: ICD-10-CM

## 2024-03-07 DIAGNOSIS — Z12.31 ENCOUNTER FOR SCREENING MAMMOGRAM FOR MALIGNANT NEOPLASM OF BREAST: ICD-10-CM

## 2024-03-07 DIAGNOSIS — Z00.00 WELLNESS EXAMINATION: Primary | ICD-10-CM

## 2024-03-07 DIAGNOSIS — R93.1 AGATSTON CORONARY ARTERY CALCIUM SCORE LESS THAN 100: ICD-10-CM

## 2024-03-07 PROBLEM — K52.831 COLLAGENOUS COLITIS: Status: ACTIVE | Noted: 2024-03-07

## 2024-03-07 PROCEDURE — 99396 PREV VISIT EST AGE 40-64: CPT | Performed by: STUDENT IN AN ORGANIZED HEALTH CARE EDUCATION/TRAINING PROGRAM

## 2024-03-07 RX ORDER — CHOLESTYRAMINE LIGHT 4 G/5.7G
POWDER, FOR SUSPENSION ORAL DAILY
COMMUNITY

## 2024-03-07 RX ORDER — EZETIMIBE 10 MG/1
10 TABLET ORAL NIGHTLY
Qty: 90 TABLET | Refills: 1 | Status: SHIPPED | OUTPATIENT
Start: 2024-03-07

## 2024-03-07 RX ORDER — IRBESARTAN 300 MG/1
300 TABLET ORAL NIGHTLY
Qty: 90 TABLET | Refills: 1 | Status: SHIPPED | OUTPATIENT
Start: 2024-03-07

## 2024-03-07 NOTE — PATIENT INSTRUCTIONS
The ultrafast heart scan also called the calcium score is another tool to determine risk for heart disease. It is a low dose CT scan that looks at calcium deposits in the coronary arteries. You can call [phone] or go online [email or web address] to schedule.

## 2024-03-07 NOTE — PROGRESS NOTES
[facility]  03/07/24    Chief Complaint   Patient presents with    Physical     HPI:   Muriel Reeder is a 63 year old female who presents for a complete physical exam.     With colitis, will notice flare with vegetables. Then will have whole wheat pasta.    Went for hearing test at [place] audiologist. Difficulty with crowds. Last test was 1/17/2023.    Helping daughter with medical appointments. Off of oxygen now and walking more has testing for her heart. She lives alone.    Dr. Parr is considering a chemical peel. She has had six squamous cell cancers removed.    Donates blood every 8 weeks.     Taking medications as prescribed.     Med/Surg/Allergy/Fam hx updates: had skin cancer excision 3 weeks ago, had plantar fasciitis flare, fell in August on right wrist and is improving  Home: going well  Work: going very well, two part time positions  Activities/Hobbies: reading  Diet: see above  Exercise: walking, exercise bike at nighttime  Sleep: good  Mood: good, some irritability towards the end of the day  Habits: Denied alcohol, tobacco, or drug use  Periods: No LMP recorded. (Menstrual status: Menopause). LMP was at age 44. No spotting.  Sexual activity: celibate for >10 years  Immunizations: did not get shingles vaccine yet, had shingles previously   Screening: no abnormal pap smears      Wt Readings from Last 6 Encounters:   03/07/24 153 lb 9.6 oz (69.7 kg)   08/24/23 152 lb (68.9 kg)   07/18/23 152 lb (68.9 kg)   06/28/23 154 lb (69.9 kg)   06/13/23 154 lb (69.9 kg)   03/27/23 154 lb (69.9 kg)     Body mass index is 32.24 kg/m².     Results for orders placed or performed in visit on 03/02/24   Comp Metabolic Panel (14)   Result Value Ref Range    Glucose 83 70 - 99 mg/dL    Sodium 140 136 - 145 mmol/L    Potassium 4.2 3.5 - 5.1 mmol/L    Chloride 107 98 - 112 mmol/L    CO2 29.0 21.0 - 32.0 mmol/L    Anion Gap 4 0 - 18 mmol/L    BUN 10 9 - 23 mg/dL    Creatinine 0.64 0.55 - 1.02  mg/dL    Calcium, Total 9.7 8.5 - 10.1 mg/dL    Calculated Osmolality 288 275 - 295 mOsm/kg    eGFR-Cr 99 >=60 mL/min/1.73m2    AST 19 15 - 37 U/L    ALT 24 13 - 56 U/L    Alkaline Phosphatase 83 50 - 130 U/L    Bilirubin, Total 0.4 0.1 - 2.0 mg/dL    Total Protein 7.0 6.4 - 8.2 g/dL    Albumin 3.7 3.4 - 5.0 g/dL    Globulin  3.3 2.8 - 4.4 g/dL    A/G Ratio 1.1 1.0 - 2.0    Patient Fasting for CMP? Yes    TSH and Free T4   Result Value Ref Range    Free T4 0.9 0.8 - 1.7 ng/dL    TSH 3.310 0.358 - 3.740 mIU/mL   Urinalysis with Culture Reflex    Specimen: Urine   Result Value Ref Range    Urine Color Colorless (A) Yellow    Clarity Urine Clear Clear    Spec Gravity 1.005 1.005 - 1.030    Glucose Urine Normal Normal mg/dL    Bilirubin Urine Negative Negative    Ketones Urine Negative Negative mg/dL    Blood Urine Negative Negative    pH Urine 6.5 5.0 - 8.0    Protein Urine Negative Negative mg/dL    Urobilinogen Urine Normal Normal mg/dL    Nitrite Urine Negative Negative    Leukocyte Esterase Urine Negative Negative    Microscopic Microscopic not indicated    Lipid Panel   Result Value Ref Range    Cholesterol, Total 180 <200 mg/dL    HDL Cholesterol 73 (H) 40 - 59 mg/dL    Triglycerides 109 30 - 149 mg/dL    LDL Cholesterol 88 <100 mg/dL    VLDL 17 0 - 30 mg/dL    Non HDL Chol 107 <130 mg/dL    Patient Fasting for Lipid? Yes    CBC W/ DIFFERENTIAL   Result Value Ref Range    WBC 3.8 (L) 4.0 - 11.0 x10(3) uL    RBC 3.89 3.80 - 5.30 x10(6)uL    HGB 11.4 (L) 12.0 - 16.0 g/dL    HCT 35.7 35.0 - 48.0 %    .0 150.0 - 450.0 10(3)uL    MCV 91.8 80.0 - 100.0 fL    MCH 29.3 26.0 - 34.0 pg    MCHC 31.9 31.0 - 37.0 g/dL    RDW 12.8 %    Neutrophil Absolute Prelim 2.15 1.50 - 7.70 x10 (3) uL    Neutrophil Absolute 2.15 1.50 - 7.70 x10(3) uL    Lymphocyte Absolute 1.18 1.00 - 4.00 x10(3) uL    Monocyte Absolute 0.31 0.10 - 1.00 x10(3) uL    Eosinophil Absolute 0.07 0.00 - 0.70 x10(3) uL    Basophil Absolute 0.06 0.00 -  0.20 x10(3) uL    Immature Granulocyte Absolute 0.01 0.00 - 1.00 x10(3) uL    Neutrophil % 56.8 %    Lymphocyte % 31.2 %    Monocyte % 8.2 %    Eosinophil % 1.9 %    Basophil % 1.6 %    Immature Granulocyte % 0.3 %       Current Outpatient Medications   Medication Sig Dispense Refill    cholestyramine light 4 g Oral Powd Pack Take by mouth daily.      ezetimibe 10 MG Oral Tab Take 1 tablet (10 mg total) by mouth nightly. 90 tablet 1    Irbesartan 300 MG Oral Tab Take 1 tablet (300 mg total) by mouth nightly. 90 tablet 1    Melatonin 10 MG Oral Tab Take 1 tablet by mouth daily.      Multiple Vitamins-Minerals (MULTIVITAMIN ADULTS 50+ OR) Take 1 tablet by mouth daily.      IRON OR Take 1 tablet by mouth daily.      Calcium Carbonate Antacid (TUMS OR) Take 1 tablet by mouth daily.        Allergies   Allergen Reactions    Rosuvastatin DIARRHEA      Past Medical History:   Diagnosis Date    Abdominal pain 5-10 years or more    [facility] and on[facility]    Actinic keratosis     Allergic rhinitis, cause unspecified     Anemia, unspecified     Arthritis 2012    Back pain     Bad breath     Belching     Blurred vision ? Worsening    Can only read 30 minutes or so.  Night vision declining.    Cancer of skin, squamous cell 09/23/2013    removed off of left foot    Chest pain on exertion 2021 or 2022    Very mild, not a real concern    Diarrhea, unspecified January 2022    Sudden urgency 5 or so times per day    Disorder of bone and cartilage, unspecified     Edema     Essential hypertension, benign     Fatigue     Female stress incontinence     Flatulence/gas pain/belching Always    Worse when I eat onions, cabbage, brocolli, cauliflower    Generalized osteoarthrosis, involving multiple sites     Gestational diabetes (HCC)     Headache disorder     High cholesterol Several years    Mildly elevated for several years but favorable ratio of good cholesterol    Itch of skin     Leukopenia      since at least     Menopause     age 45    Osteopenia     Osteoporosis     Other neutropenia (HCC)     Pain in joint, lower leg     Pain in joints     Pain with bowel movements 2022    Urgency, tenderness in abdomen during episodes    Positive KENY (antinuclear antibody)     Pyelonephritis 1980    Stool incontinence 4 or 5 times since 2022    Only if I can't run fast enough to the facilities when urgency hits    Unspecified essential hypertension     diagnosis 2006    Wears glasses 1974      Past Surgical History:   Procedure Laterality Date          x2    COLONOSCOPY      ORAL SURGERY PROCEDURE  2009    gum tissue graft from roof of mouth to bottom front gums    SKIN SURGERY Left 2021    left thumb squamous cell cancer removed    SKIN SURGERY Left 2021    left hand squamous cell cancer removed    SKIN SURGERY Left 2018    left hand squamous cell cancer removed      Family History   Problem Relation Age of Onset    Hypertension Father          of COPD Dec 1995    Asthma Father     Eye Problems Father         cataract    Alcohol and Other Disorders Associated Mother          1979 Liver Failure    Diabetes Daughter     Psychiatric Daughter     Diabetes Maternal Grandmother          1991 Congestive Heart Failure made worse by diabetes    Heart Attack Maternal Grandmother             Psychiatric Brother     Ear Problems Brother         hearing loss    Breast Cancer Maternal Cousin Female     Cancer Other         GI ca-liver, grandfather, great aunt    Heart Disease Other         CAD, grandmother    Heart Attack Other         grandmother    Hypertension Other         grandmother    Diabetes Other         grandmother, great aunt    Asthma Other         grandmother    Psychiatric Other         grandmother    Depression Other         grandmother    Arthritis Other         fam hx    Eye Problems Other         cataract, grandfather    Diabetes Daughter          Diagnosed 2023, on metformin.  Sugar under 200.      Social History:   Social History     Socioeconomic History    Marital status:    Tobacco Use    Smoking status: Former     Packs/day: 0.50     Years: 3.00     Additional pack years: 0.00     Total pack years: 1.50     Types: Cigarettes     Quit date: 1979     Years since quittin.3    Smokeless tobacco: Never    Tobacco comments:     Sporafic age 15 - 19   Vaping Use    Vaping Use: Never used   Substance and Sexual Activity    Alcohol use: No    Drug use: No    Sexual activity: Not Currently   Other Topics Concern    Caffeine Concern Yes     Comment: 6 c. coffee/day    Occupational Exposure No    Sleep Concern No    Stress Concern No    Weight Concern No    Special Diet No     Comment: does try to watch salt, increased fruit/veggie/salad    Exercise Yes     Comment: walks 3-5x/dailystationary bike    Seat Belt Yes    Self-Exams No   Social History Narrative    Works at [employer], raising grandchildren 16 to 11        REVIEW OF SYSTEMS:   GENERAL: feels well otherwise  SKIN: denies any unusual skin lesions  EYES:denies blurred vision or double vision  HEENT: denies nasal congestion, sinus pain or ST  LUNGS: denies shortness of breath with exertion, denies cough  CARDIOVASCULAR: denies chest pain on exertion or at rest, denies palpitations  GI: denies abdominal pain,denies heartburn, denies n/v/d/c/blood in stool  : denies dysuria, vaginal discharge or itching,periods regular   MUSCULOSKELETAL: denies back pain  NEURO: denies headaches, denies LH/dizziness/syncope  PSYCHE: denies depression or anxiety  HEMATOLOGIC: denies hx of anemia  ENDOCRINE: denies thyroid history  ALL/ASTHMA: denies hx of allergy or asthma    EXAM:   /80 (BP Location: Left arm, Patient Position: Sitting, Cuff Size: adult)   Pulse 78   Temp 97 °F (36.1 °C) (Temporal)   Resp 16   Ht 4' 9.87\" (1.47 m)   Wt 153 lb 9.6 oz (69.7 kg)   BMI 32.24 kg/m²   Body  mass index is 32.24 kg/m².   GENERAL: well developed, well nourished,in no apparent distress  SKIN: no rash  HEENT: atraumatic, normocephalic,ears and throat are clear  EYES:PERRLA, EOMI,conjunctiva are clear  NECK: supple,no adenopathy,no thyromegaly  BREAST: deferred per patient preference  LUNGS: clear to auscultation; no rhonchi, rales, or wheezing  CARDIO: RRR without murmur  GI: good BS's, no masses, HSM or tenderness  : deferred per patient preference  MUSCULOSKELETAL: FROM of the back  EXTREMITIES: no cyanosis, clubbing or edema  NEURO: Oriented times three,cranial nerves are intact,motor and sensory are grossly intact; 2+ knee reflexes bilaterally  VASCULAR: 2+ posterior tibial pulses bilaterally    ASSESSMENT AND PLAN:   Muriel Reeder is a 63 year old female who presents for a complete physical exam.     1. Wellness examination  - Pap and pelvic deferred to gynecology. Last pap: 09/20/2019.   - Order for mammogram and dexascan per gynecology. Last mammogram: 11/08/2023.  - Self breast exam discussed.   - BP: at goal  - Pt' s weight is Body mass index is 32.24 kg/m²., c/w obesity, recommended low fat diet and aerobic exercise 30 minutes three times weekly.    - Last colonoscopy: 07/10/2023. Up to date  - Discussed calcium score / ultrafast heart scan. 11/2021 - 67.71. - repeat 11/2024  - Comp Metabolic Panel (14); Future  - Lipid Panel; Future    2. Mixed hyperlipidemia  Mixed HLD  - Continue cholestyramine 4gram daily.  - Continue ezetimibe 10mg daily  - Comp Metabolic Panel (14); Future  - Lipid Panel; Future  - cholestyramine light 4 g Oral Powd Pack; Take by mouth daily.  - ezetimibe 10 MG Oral Tab; Take 1 tablet (10 mg total) by mouth nightly.  Dispense: 90 tablet; Refill: 1    3. Essential hypertension  Pt presents for follow up of HTN  -BP controlled  -continue current regimen  -RTC 6mo or sooner if needed  - Irbesartan 300 MG Oral Tab; Take 1 tablet (300 mg total) by mouth nightly.  Dispense: 90  tablet; Refill: 1    4. Agatston coronary artery calcium score less than 100  Cholesterol optimization above while balancing out symptoms/adverse effects    5. Encounter for screening mammogram for malignant neoplasm of breast  - [facility] 2D+3D SCREENING BILAT (CPT=77067/75738); Future          The patient indicates understanding of these issues and agrees to the plan.      Health maintenance, will check:   Orders Placed This Encounter   Procedures    Comp Metabolic Panel (14)    Lipid Panel           Encounter Diagnoses   Name Primary?    Wellness examination Yes    Mixed hyperlipidemia     Essential hypertension     Agatston coronary artery calcium score less than 100     Encounter for screening mammogram for malignant neoplasm of breast        Meds & Refills for this Visit:  Requested Prescriptions     Signed Prescriptions Disp Refills    ezetimibe 10 MG Oral Tab 90 tablet 1     Sig: Take 1 tablet (10 mg total) by mouth nightly.    Irbesartan 300 MG Oral Tab 90 tablet 1     Sig: Take 1 tablet (300 mg total) by mouth nightly.       Imaging & Consults:  [facility] 2D+3D SCREENING BILAT (CPT=77067/46205)      Return in about 6 months (around 9/7/2024) for medication follow up, or sooner if needed.      Zulema Carbajal MD  [facility]  03/07/24

## 2024-03-12 ENCOUNTER — PATIENT MESSAGE (OUTPATIENT)
Dept: FAMILY MEDICINE CLINIC | Facility: CLINIC | Age: 64
End: 2024-03-12

## 2024-03-12 NOTE — TELEPHONE ENCOUNTER
From: Muriel Reeder  To: Zulema TAMIKORoz Carbajal  Sent: 3/12/2024 10:09 AM CDT  Subject: Forgot to mention headaches during physical     Thought I would mention but not sure whether it is a concern.    I have frequent short lived \"ice pick\" headaches mostly confined to right side of head and very localized. They hit, make me wince and sometimes make my eyes water, then fade away. They are not muscular. I suspect they are vascular.     The only other very very slight possibility is some form of residual PHN as I had three, maybe four very severe rounds of something like shingles or hepatic daily on my face and neck, and five or six rounds on my right hand when I was in my teens and 20s. They were preceded by intense burning, stinging and sometimes shooting pains or throbbing.     The headaches may be nothing to worry about but I meant to mention at my physical just to \"keep it on the radar\".    [place].   Muriel

## 2024-03-13 NOTE — TELEPHONE ENCOUNTER
When did these start? Any worsening? Could have a variety of etiologies. Best if we followed up to discuss further, can use SDA if needed. If any severe headaches I recommend ER evaluation. Thank you.

## 2024-03-13 NOTE — TELEPHONE ENCOUNTER
S/W pt and she states her headaches she has had for over 5 years, but has been more frequent.  States it is like someone is hammering a nail on her head which last about few seconds.  States when it happens is really painful.    Scheduled pt on 04/01/2024.  Advised ER if HA's worsens.  Pt voiced understanding and agreed with plan.

## 2024-03-25 ENCOUNTER — PATIENT MESSAGE (OUTPATIENT)
Dept: FAMILY MEDICINE CLINIC | Facility: CLINIC | Age: 64
End: 2024-03-25

## 2024-03-25 NOTE — TELEPHONE ENCOUNTER
Thank you for the update. Check blood pressures at home as well and bring a list to upcoming appointment to review. If any HA, chest pain, dizziness, nausea to go to ER. Thank you.

## 2024-03-25 NOTE — TELEPHONE ENCOUNTER
From: Muriel Reeder  To: Zulema Carbajal  Sent: 3/25/2024 4:03 PM CDT  Subject: High Blood Pressure    Good afternoon.   Please visit blood pressure concerns at my April 1 appointment.     My dentist's office today declined to do a filling because my blood pressure was too high, taken three times over thirty minutes. They rescheduled me for April 8 and asked for me to see my primary doctor to resolve before then. I am attaching photos of letter from dentist and will bring original to my April 1st appointment.     Could this be a contributing factor to my intense brief localized headaches?    Blessed Holy Week to all.  Muriel Reeder

## 2024-04-01 ENCOUNTER — OFFICE VISIT (OUTPATIENT)
Dept: FAMILY MEDICINE CLINIC | Facility: CLINIC | Age: 64
End: 2024-04-01
Payer: COMMERCIAL

## 2024-04-01 VITALS
BODY MASS INDEX: 32.29 KG/M2 | SYSTOLIC BLOOD PRESSURE: 140 MMHG | RESPIRATION RATE: 16 BRPM | TEMPERATURE: 97 F | HEART RATE: 90 BPM | DIASTOLIC BLOOD PRESSURE: 98 MMHG | WEIGHT: 153.81 LBS | HEIGHT: 57.87 IN

## 2024-04-01 DIAGNOSIS — I10 ESSENTIAL HYPERTENSION, BENIGN: Primary | ICD-10-CM

## 2024-04-01 DIAGNOSIS — G89.29 CHRONIC INTRACTABLE HEADACHE, UNSPECIFIED HEADACHE TYPE: ICD-10-CM

## 2024-04-01 DIAGNOSIS — R51.9 CHRONIC INTRACTABLE HEADACHE, UNSPECIFIED HEADACHE TYPE: ICD-10-CM

## 2024-04-01 PROCEDURE — 99214 OFFICE O/P EST MOD 30 MIN: CPT | Performed by: STUDENT IN AN ORGANIZED HEALTH CARE EDUCATION/TRAINING PROGRAM

## 2024-04-01 RX ORDER — HYDROCHLOROTHIAZIDE 12.5 MG/1
12.5 CAPSULE, GELATIN COATED ORAL DAILY
Qty: 90 CAPSULE | Refills: 0 | Status: SHIPPED | OUTPATIENT
Start: 2024-04-01

## 2024-04-01 NOTE — PROGRESS NOTES
[facility] Note  04/01/24    Chief complaint:   Chief Complaint   Patient presents with    Headache     HPI:   Muriel Reeder is a 63 year old female who presents for blood pressure and headaches.    Patient went to the dentist and had elevated BP so was advised to follow up in order to get dental work done.     BP Meds: Irbesartan Tabs - 300 MG  daily.    Was previously on hydrochlorothiazide 12.5mg daily as well.    Some blood pressures from donating blood over several months 171/99, 170/90, 147/95, 167/99    Sometimes has headache.  Normally right parietal area about 4cm round and it goes away quickly, feels like a icepick headache. Does not have to take medication for it.     Avoids medications due to stomach upset including tylenol and advil.    It was high at the dentist and had a very busy day. Tried four square breathing.     Working on diet. Having half a pot of coffee.    Wt Readings from Last 6 Encounters:   04/01/24 153 lb 12.8 oz (69.8 kg)   03/07/24 153 lb 9.6 oz (69.7 kg)   08/24/23 152 lb (68.9 kg)   07/18/23 152 lb (68.9 kg)   06/28/23 154 lb (69.9 kg)   06/13/23 154 lb (69.9 kg)       Past Medical History:   Diagnosis Date    Abdominal pain 5-10 years or more    Chronic RUQ pain off and on, see mycj[facility] for gallbladder tests    Actinic keratosis     Allergic rhinitis, cause unspecified     Anemia, unspecified     Arthritis 2012    Back pain     Bad breath     Belching     Blurred vision ? Worsening    Can only read 30 minutes or so.  Night vision declining.    Cancer of skin, squamous cell 09/23/2013    removed off of left foot    Chest pain on exertion 2021 or 2022    Very mild, not a real concern    Diarrhea, unspecified January 2022    Sudden urgency 5 or so times per day    Disorder of bone and cartilage, unspecified     Edema     Essential hypertension, benign     Fatigue     Female stress incontinence     Flatulence/gas pain/belching Always    Worse when I  eat onions, cabbage, brocolli, cauliflower    Generalized osteoarthrosis, involving multiple sites     Gestational diabetes (HCC)     Headache disorder     High cholesterol Several years    Mildly elevated for several years but favorable ratio of good cholesterol    Itch of skin     Leukopenia     since at least     Menopause     age 45    Osteopenia     Osteoporosis     Other neutropenia (HCC)     Pain in joint, lower leg     Pain in joints     Pain with bowel movements 2022    Urgency, tenderness in abdomen during episodes    Positive KENY (antinuclear antibody)     Pyelonephritis 1980    Stool incontinence 4 or 5 times since 2022    Only if I can't run fast enough to the facilities when urgency hits    Unspecified essential hypertension     diagnosis 2006    Wears glasses      Past Surgical History:   Procedure Laterality Date          x2    COLONOSCOPY      ORAL SURGERY PROCEDURE  2009    gum tissue graft from roof of mouth to bottom front gums    SKIN SURGERY Left 2021    left thumb squamous cell cancer removed    SKIN SURGERY Left 2021    left hand squamous cell cancer removed    SKIN SURGERY Left 2018    left hand squamous cell cancer removed     Allergies   Allergen Reactions    Rosuvastatin DIARRHEA      hydroCHLOROthiazide 12.5 MG Oral Cap Take 1 capsule (12.5 mg total) by mouth daily. 90 capsule 0    cholestyramine light 4 g Oral Powd Pack Take by mouth daily.      ezetimibe 10 MG Oral Tab Take 1 tablet (10 mg total) by mouth nightly. 90 tablet 1    Irbesartan 300 MG Oral Tab Take 1 tablet (300 mg total) by mouth nightly. 90 tablet 1    Melatonin 10 MG Oral Tab Take 1 tablet by mouth daily.      Multiple Vitamins-Minerals (MULTIVITAMIN ADULTS 50+ OR) Take 1 tablet by mouth daily.      IRON OR Take 1 tablet by mouth daily.      Calcium Carbonate Antacid (TUMS OR) Take 1 tablet by mouth daily.       Social History     Socioeconomic History    Marital status:     Tobacco Use    Smoking status: Former     Packs/day: 0.50     Years: 3.00     Additional pack years: 0.00     Total pack years: 1.50     Types: Cigarettes     Quit date: 1979     Years since quittin.4    Smokeless tobacco: Never    Tobacco comments:     Sporafic age 15 - 19   Vaping Use    Vaping Use: Never used   Substance and Sexual Activity    Alcohol use: No    Drug use: No    Sexual activity: Not Currently   Other Topics Concern    Caffeine Concern Yes     Comment: 6 c. coffee/day    Occupational Exposure No    Sleep Concern No    Stress Concern No    Weight Concern No    Special Diet No     Comment: does try to watch salt, increased fruit/veggie/salad    Exercise Yes     Comment: walks 3-5x/dailystationary bike    Seat Belt Yes    Self-Exams No   Social History Narrative    Works at [employer], raising grandchildren 16 to 11     Counseling given: Not Answered  Tobacco comments: Sporafic age 15 - 19    Family History   Problem Relation Age of Onset    Hypertension Father          of COPD Dec 1995    Asthma Father     Eye Problems Father         cataract    Alcohol and Other Disorders Associated Mother          1979 Liver Failure    Diabetes Daughter     Psychiatric Daughter     Diabetes Maternal Grandmother          1991 Congestive Heart Failure made worse by diabetes    Heart Attack Maternal Grandmother             Psychiatric Brother     Ear Problems Brother         hearing loss    Breast Cancer Maternal Cousin Female     Cancer Other         GI ca-liver, grandfather, great aunt    Heart Disease Other         CAD, grandmother    Heart Attack Other         grandmother    Hypertension Other         grandmother    Diabetes Other         grandmother, great aunt    Asthma Other         grandmother    Psychiatric Other         grandmother    Depression Other         grandmother    Arthritis Other         fam hx    Eye Problems Other         cataract, grandfather     Diabetes Daughter         Diagnosed March 2023, on metformin.  Sugar under 200.     Family Status   Relation Status    Fa (Not Specified)    Mo (Not Specified)    Karuna (Not Specified)    MGMA (Not Specified)    Bro (Not Specified)    Bro (Not Specified)    Mat Cous Fem Alive    Other (Not Specified)    Other (Not Specified)    Other (Not Specified)    Other (Not Specified)    Other (Not Specified)    Other (Not Specified)    Other (Not Specified)    Other (Not Specified)    Other (Not Specified)    Other (Not Specified)    Karuna (Not Specified)        REVIEW OF SYSTEMS:   See HPI    EXAM:   BP (!) 140/98 (BP Location: Left arm, Patient Position: Sitting, Cuff Size: large)   Pulse 90   Temp 97.1 °F (36.2 °C) (Temporal)   Resp 16   Ht 4' 9.87\" (1.47 m)   Wt 153 lb 12.8 oz (69.8 kg)   BMI 32.29 kg/m²  Estimated body mass index is 32.29 kg/m² as calculated from the following:    Height as of this encounter: 4' 9.87\" (1.47 m).    Weight as of this encounter: 153 lb 12.8 oz (69.8 kg).   Vital signs reviewed. Appears stated age, well groomed.  Physical Exam:  GEN:  Patient is alert and oriented x3, no apparent distress  HEAD:  Normocephalic, atraumatic  HEENT:  no scleral icterus, conjunctivae clear bilaterally, EOMI, PERRLA, OP clear  LUNGS: clear to auscultation bilaterally, no rales/rhonchi/wheezing  HEART:  Regular rate and rhythm, normal S1/S2, no murmurs, rubs or gallops  EXTREMITIES:  Moves all extremities well, no edema noted  NEURO:  CN 2 - 12 grossly intact, gait normal, 2+ patellar reflexes b/l      ASSESSMENT AND PLAN:   1. Essential hypertension, benign  Patient presents for HTN follow up.   - will continue irbesartan 300mg daily  - will restart hydrochlorothiazide 12.5mg daily  - check BP at home  - if any red flag symptoms headache, dizziness, vision changes, acute onset numbness/tingling, chest pain, shortness of breath should go to the ER  - send my chart message with updated blood pressures in the next  couple weeks  - follow up if no improvement, otherwise as recommended in 6 months  - hydroCHLOROthiazide 12.5 MG Oral Cap; Take 1 capsule (12.5 mg total) by mouth daily.  Dispense: 90 capsule; Refill: 0  - Comp Metabolic Panel (14) [E]; Future    2. Chronic intractable headache, unspecified headache type  Patient reports intermittent headaches, suspect due to elevated blood pressure. If persistent despite improved BP then will evaluate further.         Meds & Refills for this Visit:  Requested Prescriptions     Signed Prescriptions Disp Refills    hydroCHLOROthiazide 12.5 MG Oral Cap 90 capsule 0     Sig: Take 1 capsule (12.5 mg total) by mouth daily.       Start Taking               hydroCHLOROthiazide 12.5 MG Oral Cap Take 1 capsule (12.5 mg total) by mouth daily.            Health Maintenance:  Health Maintenance Due   Topic Date Due    Pneumococcal Vaccine: Birth to 64yrs (1 of 2 - PCV) Never done    Zoster Vaccines (1 of 2) Never done    Pap Smear  09/20/2024       Patient/Caregiver Education: There are no barriers to learning. Medical education done.   Outcome: Patient verbalizes understanding. Patient is notified to call with any questions, complications, allergies, or worsening or changing symptoms.  Patient is to call with any side effects or complications from the treatments as a result of today.     Problem List:  Patient Active Problem List   Diagnosis    Essential hypertension, benign    Generalized osteoarthrosis, involving multiple sites    RUQ abdominal pain    Degeneration of intervertebral disc at L5-S1 level    Chronic left-sided low back pain with left-sided sciatica    Primary osteoarthritis of both knees    Age-related osteoporosis without current pathological fracture    Hypokalemia    Diarrhea, unspecified    Abdominal pain, right upper quadrant    Anemia, unspecified    Agatston coronary artery calcium score less than 100    Collagenous colitis       No follow-ups on file.    Zulema  MD [place]  [facility]  04/01/24      Please note that portions of this note may have been completed with a voice recognition program. Efforts were made to edit the dictations but occasionally words are mis-transcribed. Thank you for your understanding.

## 2024-04-02 ENCOUNTER — MED REC SCAN ONLY (OUTPATIENT)
Dept: FAMILY MEDICINE CLINIC | Facility: CLINIC | Age: 64
End: 2024-04-02

## 2024-06-03 ENCOUNTER — PATIENT MESSAGE (OUTPATIENT)
Dept: FAMILY MEDICINE CLINIC | Facility: CLINIC | Age: 64
End: 2024-06-03

## 2024-06-03 DIAGNOSIS — R00.0 PULSE FAST: Primary | ICD-10-CM

## 2024-06-04 NOTE — TELEPHONE ENCOUNTER
From: Muriel Reeder  To: Zulema Carbajal  Sent: 6/3/2024 2:30 PM CDT  Subject: Blood pressure    I'm supposed to send in BP readings.. I don't trust the at home cuff but just now it was 111/80 with heart rate 105. Felt tired and slightly off so I took reading. No exercise, just doing paperwork and heart rate slightly up.. Previous BP a week or so ago was 113/something?

## 2024-06-05 NOTE — TELEPHONE ENCOUNTER
Has patient's heart rate been elevated with other blood pressure readings? I will order an EKG, but we should follow up to review blood pressures, can bring in cuff and log to follow up appointment - can use SDA.  Thank you.

## 2024-06-07 NOTE — TELEPHONE ENCOUNTER
I called the patient. She has not noticed when she has checked her blood pressure if her heart rate has been elevated. She made an appointment for next Friday 06/14 at 2:30 to go over blood pressure readings. She will bring her cuff and her readings. Pt. Agreed to plan and verbalized understanding

## 2024-07-10 ENCOUNTER — PATIENT MESSAGE (OUTPATIENT)
Dept: FAMILY MEDICINE CLINIC | Facility: CLINIC | Age: 64
End: 2024-07-10

## 2024-07-11 NOTE — TELEPHONE ENCOUNTER
Reviewed, I recommend continuing to monitor. If no chest pain, shortness of breath, dizziness, headache, nausea, spots/flashes in vision, numbness/tingling then can continue to monitor. Should schedule follow up in October 2024 for 6 mo check if BP's are better at home <140/<90. Thank you.

## 2024-07-11 NOTE — TELEPHONE ENCOUNTER
From: Muriel Reeder  To: Zulema Carbajal  Sent: 7/10/2024 7:26 PM CDT  Subject: Reporting a Blood Pressure Reading     Dr. Mast had asked that I give her blood pressure readings occasionally. I donated blood today and my reading was 164/86.    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 So easily explainable but thought she might wish to make note for future reference.     Happy Summer!  Muriel

## 2024-07-23 DIAGNOSIS — I10 ESSENTIAL HYPERTENSION, BENIGN: ICD-10-CM

## 2024-07-23 RX ORDER — HYDROCHLOROTHIAZIDE 12.5 MG/1
12.5 CAPSULE, GELATIN COATED ORAL DAILY
Qty: 90 CAPSULE | Refills: 0 | Status: SHIPPED | OUTPATIENT
Start: 2024-07-23

## 2024-07-23 NOTE — TELEPHONE ENCOUNTER
Did not pass protocol  Last office visit 3/7/2024  Last refill was: ,4/1/2024  90 __tabs  Next appointment: none scheduled    Please sign pended medication if appropriate            Medication Quantity Refills Start End   hydroCHLOROthiazide 12.5 MG Oral Cap 90 capsule 0 4/1/2024 --   Sig:   Take 1 capsule (12.5 mg total) by mouth daily.     Route:   Oral

## 2024-08-03 ENCOUNTER — LAB ENCOUNTER (OUTPATIENT)
Dept: LAB | Facility: HOSPITAL | Age: 64
End: 2024-08-03
Payer: COMMERCIAL

## 2024-08-03 DIAGNOSIS — R10.11 RUQ PAIN: ICD-10-CM

## 2024-08-03 LAB
ALBUMIN SERPL-MCNC: 4.6 G/DL (ref 3.2–4.8)
ALBUMIN/GLOB SERPL: 1.8 {RATIO} (ref 1–2)
ALP LIVER SERPL-CCNC: 78 U/L
ALT SERPL-CCNC: 18 U/L
ANION GAP SERPL CALC-SCNC: 1 MMOL/L (ref 0–18)
AST SERPL-CCNC: 22 U/L (ref ?–34)
BILIRUB SERPL-MCNC: 0.5 MG/DL (ref 0.2–1.1)
BUN BLD-MCNC: 14 MG/DL (ref 9–23)
CALCIUM BLD-MCNC: 10 MG/DL (ref 8.7–10.4)
CHLORIDE SERPL-SCNC: 105 MMOL/L (ref 98–112)
CO2 SERPL-SCNC: 33 MMOL/L (ref 21–32)
CREAT BLD-MCNC: 0.73 MG/DL
DEPRECATED HBV CORE AB SER IA-ACNC: 12.7 NG/ML
EGFRCR SERPLBLD CKD-EPI 2021: 92 ML/MIN/1.73M2 (ref 60–?)
FASTING STATUS PATIENT QL REPORTED: YES
FOLATE SERPL-MCNC: >24 NG/ML (ref 5.4–?)
GLOBULIN PLAS-MCNC: 2.5 G/DL (ref 2–3.5)
GLUCOSE BLD-MCNC: 81 MG/DL (ref 70–99)
IRON SATN MFR SERPL: 20 %
IRON SERPL-MCNC: 65 UG/DL
OSMOLALITY SERPL CALC.SUM OF ELEC: 288 MOSM/KG (ref 275–295)
POTASSIUM SERPL-SCNC: 3.8 MMOL/L (ref 3.5–5.1)
PROT SERPL-MCNC: 7.1 G/DL (ref 5.7–8.2)
SODIUM SERPL-SCNC: 139 MMOL/L (ref 136–145)
TOTAL IRON BINDING CAPACITY: 321 UG/DL (ref 250–425)
TRANSFERRIN SERPL-MCNC: 234 MG/DL (ref 250–380)
VIT B12 SERPL-MCNC: 1030 PG/ML (ref 211–911)

## 2024-08-03 PROCEDURE — 82746 ASSAY OF FOLIC ACID SERUM: CPT

## 2024-08-03 PROCEDURE — 83550 IRON BINDING TEST: CPT

## 2024-08-03 PROCEDURE — 82607 VITAMIN B-12: CPT

## 2024-08-03 PROCEDURE — 80053 COMPREHEN METABOLIC PANEL: CPT

## 2024-08-03 PROCEDURE — 36415 COLL VENOUS BLD VENIPUNCTURE: CPT

## 2024-08-03 PROCEDURE — 82728 ASSAY OF FERRITIN: CPT

## 2024-08-03 PROCEDURE — 83540 ASSAY OF IRON: CPT

## 2024-08-24 ENCOUNTER — HOSPITAL ENCOUNTER (OUTPATIENT)
Dept: ULTRASOUND IMAGING | Age: 64
Discharge: HOME | End: 2024-08-24
Payer: COMMERCIAL

## 2024-08-24 DIAGNOSIS — R10.11 RUQ PAIN: ICD-10-CM

## 2024-08-24 PROCEDURE — 76700 US EXAM ABDOM COMPLETE: CPT

## 2024-08-27 NOTE — PROGRESS NOTES
Results were reviewed and discussed with the patient over the phone. US of the abdomen revealed  Stable mild dilatation of the common bile duct measuring up to 8 mm in diameter. Recommend MRI/MRCP to assess further. Also, there is an 18 mm simple appearing cyst in the upper pole left kidney, warranting no specific follow-up. She verbalizes understanding.

## 2024-09-28 ENCOUNTER — LAB ENCOUNTER (OUTPATIENT)
Dept: LAB | Facility: HOSPITAL | Age: 64
End: 2024-09-28
Payer: COMMERCIAL

## 2024-09-28 DIAGNOSIS — I10 ESSENTIAL HYPERTENSION, BENIGN: ICD-10-CM

## 2024-09-28 DIAGNOSIS — Z00.00 WELLNESS EXAMINATION: ICD-10-CM

## 2024-09-28 DIAGNOSIS — E78.2 MIXED HYPERLIPIDEMIA: ICD-10-CM

## 2024-09-28 LAB
ALBUMIN SERPL-MCNC: 4.1 G/DL (ref 3.2–4.8)
ALBUMIN/GLOB SERPL: 1.5 {RATIO} (ref 1–2)
ALP LIVER SERPL-CCNC: 76 U/L
ALT SERPL-CCNC: 18 U/L
ANION GAP SERPL CALC-SCNC: 5 MMOL/L (ref 0–18)
AST SERPL-CCNC: 22 U/L (ref ?–34)
BILIRUB SERPL-MCNC: 0.5 MG/DL (ref 0.2–1.1)
BUN BLD-MCNC: 11 MG/DL (ref 9–23)
CALCIUM BLD-MCNC: 10.3 MG/DL (ref 8.7–10.4)
CHLORIDE SERPL-SCNC: 102 MMOL/L (ref 98–112)
CHOLEST SERPL-MCNC: 181 MG/DL (ref ?–200)
CO2 SERPL-SCNC: 30 MMOL/L (ref 21–32)
CREAT BLD-MCNC: 0.69 MG/DL
EGFRCR SERPLBLD CKD-EPI 2021: 97 ML/MIN/1.73M2 (ref 60–?)
FASTING PATIENT LIPID ANSWER: YES
FASTING STATUS PATIENT QL REPORTED: YES
GLOBULIN PLAS-MCNC: 2.7 G/DL (ref 2–3.5)
GLUCOSE BLD-MCNC: 84 MG/DL (ref 70–99)
HDLC SERPL-MCNC: 72 MG/DL (ref 40–59)
LDLC SERPL CALC-MCNC: 96 MG/DL (ref ?–100)
NONHDLC SERPL-MCNC: 109 MG/DL (ref ?–130)
OSMOLALITY SERPL CALC.SUM OF ELEC: 283 MOSM/KG (ref 275–295)
POTASSIUM SERPL-SCNC: 3.3 MMOL/L (ref 3.5–5.1)
PROT SERPL-MCNC: 6.8 G/DL (ref 5.7–8.2)
SODIUM SERPL-SCNC: 137 MMOL/L (ref 136–145)
TRIGL SERPL-MCNC: 67 MG/DL (ref 30–149)
VLDLC SERPL CALC-MCNC: 11 MG/DL (ref 0–30)

## 2024-09-28 PROCEDURE — 80053 COMPREHEN METABOLIC PANEL: CPT

## 2024-09-28 PROCEDURE — 80061 LIPID PANEL: CPT

## 2024-09-28 PROCEDURE — 36415 COLL VENOUS BLD VENIPUNCTURE: CPT

## 2024-10-01 DIAGNOSIS — E87.6 LOW SERUM POTASSIUM: Primary | ICD-10-CM

## 2024-10-03 ENCOUNTER — HOSPITAL ENCOUNTER (OUTPATIENT)
Dept: MRI IMAGING | Facility: HOSPITAL | Age: 64
Discharge: HOME | End: 2024-10-03
Payer: COMMERCIAL

## 2024-10-03 DIAGNOSIS — K83.8 COMMON BILE DUCT DILATION: ICD-10-CM

## 2024-10-03 PROCEDURE — 74183 MRI ABD W/O CNTR FLWD CNTR: CPT

## 2024-10-03 PROCEDURE — A9575 INJ GADOTERATE MEGLUMI 0.1ML: HCPCS

## 2024-10-03 PROCEDURE — 76376 3D RENDER W/INTRP POSTPROCES: CPT

## 2024-10-03 RX ORDER — GADOTERATE MEGLUMINE 376.9 MG/ML
15 INJECTION INTRAVENOUS
Status: COMPLETED | OUTPATIENT
Start: 2024-10-03 | End: 2024-10-03

## 2024-10-03 RX ADMIN — GADOTERATE MEGLUMINE 14 ML: 376.9 INJECTION INTRAVENOUS at 17:53:00

## 2024-10-22 ENCOUNTER — LAB ENCOUNTER (OUTPATIENT)
Dept: LAB | Facility: HOSPITAL | Age: 64
End: 2024-10-22
Attending: STUDENT IN AN ORGANIZED HEALTH CARE EDUCATION/TRAINING PROGRAM
Payer: COMMERCIAL

## 2024-10-22 DIAGNOSIS — E87.6 LOW SERUM POTASSIUM: ICD-10-CM

## 2024-10-22 LAB
ANION GAP SERPL CALC-SCNC: 5 MMOL/L (ref 0–18)
BUN BLD-MCNC: 8 MG/DL (ref 9–23)
CALCIUM BLD-MCNC: 10.2 MG/DL (ref 8.7–10.4)
CHLORIDE SERPL-SCNC: 103 MMOL/L (ref 98–112)
CO2 SERPL-SCNC: 33 MMOL/L (ref 21–32)
CREAT BLD-MCNC: 0.66 MG/DL
EGFRCR SERPLBLD CKD-EPI 2021: 98 ML/MIN/1.73M2 (ref 60–?)
FASTING STATUS PATIENT QL REPORTED: YES
GLUCOSE BLD-MCNC: 83 MG/DL (ref 70–99)
OSMOLALITY SERPL CALC.SUM OF ELEC: 289 MOSM/KG (ref 275–295)
POTASSIUM SERPL-SCNC: 3.1 MMOL/L (ref 3.5–5.1)
SODIUM SERPL-SCNC: 141 MMOL/L (ref 136–145)

## 2024-10-22 PROCEDURE — 36415 COLL VENOUS BLD VENIPUNCTURE: CPT

## 2024-10-22 PROCEDURE — 80048 BASIC METABOLIC PNL TOTAL CA: CPT

## 2024-10-23 DIAGNOSIS — I10 ESSENTIAL HYPERTENSION, BENIGN: ICD-10-CM

## 2024-10-24 ENCOUNTER — OFFICE VISIT (OUTPATIENT)
Dept: FAMILY MEDICINE CLINIC | Facility: CLINIC | Age: 64
End: 2024-10-24
Payer: COMMERCIAL

## 2024-10-24 VITALS
TEMPERATURE: 98 F | HEIGHT: 58 IN | DIASTOLIC BLOOD PRESSURE: 78 MMHG | RESPIRATION RATE: 16 BRPM | BODY MASS INDEX: 31.7 KG/M2 | HEART RATE: 82 BPM | WEIGHT: 151 LBS | SYSTOLIC BLOOD PRESSURE: 108 MMHG

## 2024-10-24 DIAGNOSIS — I10 ESSENTIAL HYPERTENSION, BENIGN: Primary | ICD-10-CM

## 2024-10-24 DIAGNOSIS — E87.6 HYPOKALEMIA: ICD-10-CM

## 2024-10-24 DIAGNOSIS — R01.1 MURMUR, CARDIAC: ICD-10-CM

## 2024-10-24 DIAGNOSIS — E78.2 MIXED HYPERLIPIDEMIA: ICD-10-CM

## 2024-10-24 DIAGNOSIS — Z23 NEED FOR VACCINATION: ICD-10-CM

## 2024-10-24 PROCEDURE — 90677 PCV20 VACCINE IM: CPT | Performed by: STUDENT IN AN ORGANIZED HEALTH CARE EDUCATION/TRAINING PROGRAM

## 2024-10-24 PROCEDURE — 90471 IMMUNIZATION ADMIN: CPT | Performed by: STUDENT IN AN ORGANIZED HEALTH CARE EDUCATION/TRAINING PROGRAM

## 2024-10-24 PROCEDURE — 90656 IIV3 VACC NO PRSV 0.5 ML IM: CPT | Performed by: STUDENT IN AN ORGANIZED HEALTH CARE EDUCATION/TRAINING PROGRAM

## 2024-10-24 PROCEDURE — 99214 OFFICE O/P EST MOD 30 MIN: CPT | Performed by: STUDENT IN AN ORGANIZED HEALTH CARE EDUCATION/TRAINING PROGRAM

## 2024-10-24 PROCEDURE — 90472 IMMUNIZATION ADMIN EACH ADD: CPT | Performed by: STUDENT IN AN ORGANIZED HEALTH CARE EDUCATION/TRAINING PROGRAM

## 2024-10-24 RX ORDER — POTASSIUM CHLORIDE 1500 MG/1
20 TABLET, EXTENDED RELEASE ORAL DAILY
Qty: 30 TABLET | Refills: 0 | Status: SHIPPED | OUTPATIENT
Start: 2024-10-24 | End: 2024-11-23

## 2024-10-24 NOTE — TELEPHONE ENCOUNTER
Future Appointments   Date Time Provider Department Center   10/24/2024  4:30 PM Zulema Carbajal MD [facility] [id number]       Will hold for upcoming appointment.

## 2024-10-24 NOTE — PROGRESS NOTES
[facility] Note  10/24/24    Chief Complaint   Patient presents with    Medication Follow-Up     HPI:   Muriel Reeder is a 64 year old female who presents for follow up.    Having flare of colitis.     Will try sugar free powerade.    Would like flu shot and pneumonia.     Tired often. Sleeps well most of the time.     Patient presents for recheck of her hypertension. Pt has been taking medications as instructed, no medication side effects.  BP Meds: hydroCHLOROthiazide Caps - 12.5 MG; Irbesartan Tabs - 300 MG   Last Cr was 0.66 done on 10/22/2024.Last eGFR was 98 on 10/22/2024.    Patient presents for follow up of hyperlipidemia. Patient has been taking medications as prescribed, no muscle aches noted. Working on diet and exercise. Will use exercise bike.  Cholesterol Meds: colestipol Tabs - 1 g; ezetimibe Tabs - 10 MG   Cholesterol: 181, done on 9/28/2024.  HDL Cholesterol: 72, done on 9/28/2024.  LDL Cholesterol: 96, done on 9/28/2024.  TriGlycerides 67, done on 9/28/2024.    Component      Latest Ref Rng 3/2/2024 8/3/2024 9/28/2024 10/22/2024   Glucose      70 - 99 mg/dL 83  81  84  83    Sodium      136 - 145 mmol/L 140  139  137  141    Potassium      3.5 - 5.1 mmol/L 4.2  3.8  3.3 (L)  3.1 (L)    Chloride      98 - 112 mmol/L 107  105  102  103    Carbon Dioxide, Total      21.0 - 32.0 mmol/L 29.0  33.0 (H)  30.0  33.0 (H)    ANION GAP      0 - 18 mmol/L 4  1  5  5    BUN      9 - 23 mg/dL 10  14  11  8 (L)    CREATININE      0.55 - 1.02 mg/dL 0.64  0.73  0.69  0.66    CALCIUM      8.7 - 10.4 mg/dL 9.7  10.0  10.3  10.2    CALCULATED OSMOLALITY      275 - 295 mOsm/kg 288  288  283  289    EGFR      >=60 mL/min/1.73m2 99  92  97  98    AST (SGOT)      <34 U/L 19  22  22     ALT (SGPT)      10 - 49 U/L 24  18  18     ALKALINE PHOSPHATASE      50 - 130 U/L 83  78  76     Total Bilirubin      0.2 - 1.1 mg/dL 0.4  0.5  0.5     PROTEIN, TOTAL      5.7 - 8.2 g/dL 7.0  7.1  6.8      Albumin      3.2 - 4.8 g/dL 3.7  4.6  4.1     Globulin      2.0 - 3.5 g/dL 3.3  2.5  2.7     A/G Ratio      1.0 - 2.0  1.1  1.8  1.5     Patient Fasting for CMP? Yes  Yes  Yes     Patient Fasting for BMP?    Yes       Legend:  (H) High  (L) Low    Wt Readings from Last 6 Encounters:   10/24/24 151 lb (68.5 kg)   08/02/24 149 lb (67.6 kg)   04/01/24 153 lb 12.8 oz (69.8 kg)   03/07/24 153 lb 9.6 oz (69.7 kg)   08/24/23 152 lb (68.9 kg)   07/18/23 152 lb (68.9 kg)     Past Medical History:    Abdominal pain    Chronic RUQ pain off and on, see mychart for gallbladder tests    Actinic keratosis    Allergic rhinitis, cause unspecified    Anemia, unspecified    Arthritis    Back pain    Bad breath    Belching    Blurred vision    Can only read 30 minutes or so.  Night vision declining.    Cancer of skin, squamous cell    removed off of left foot    Chest pain on exertion    Very mild, not a real concern    Diarrhea, unspecified    Sudden urgency 5 or so times per day    Disorder of bone and cartilage, unspecified    Edema    Essential hypertension, benign    Fatigue    Female stress incontinence    Flatulence/gas pain/belching    Worse when I eat onions, cabbage, brocolli, cauliflower    Generalized osteoarthrosis, involving multiple sites    Gestational diabetes (HCC)    Headache disorder    High cholesterol    Mildly elevated for several years but favorable ratio of good cholesterol    Itch of skin    Leukopenia    since at least 1999    Menopause    age 45    Osteopenia    Osteoporosis    Other neutropenia (HCC)    Pain in joint, lower leg    Pain in joints    Pain with bowel movements    Urgency, tenderness in abdomen during episodes    Positive KENY (antinuclear antibody)    Pyelonephritis    Stool incontinence    Only if I can't run fast enough to the facilities when urgency hits    Unspecified essential hypertension    diagnosis 2006    Wears glasses     Past Surgical History:   Procedure Laterality Date           x2    Colonoscopy      Oral surgery procedure  2009    gum tissue graft from roof of mouth to bottom front gums    Skin surgery Left 2021    left thumb squamous cell cancer removed    Skin surgery Left 2021    left hand squamous cell cancer removed    Skin surgery Left 2018    left hand squamous cell cancer removed     Allergies[1]   Potassium Chloride ER 20 MEQ Oral Tab CR Take 20 mEq by mouth daily. 30 tablet 0    colestipol 1 g Oral Tab Take 2 tablets (2 g total) by mouth 2 (two) times daily. 120 tablet 1    hydroCHLOROthiazide 12.5 MG Oral Cap Take 1 capsule (12.5 mg total) by mouth daily. 90 capsule 0    ezetimibe 10 MG Oral Tab Take 1 tablet (10 mg total) by mouth nightly. 90 tablet 1    Irbesartan 300 MG Oral Tab Take 1 tablet (300 mg total) by mouth nightly. 90 tablet 1    Melatonin 10 MG Oral Tab Take 1 tablet by mouth daily.      Multiple Vitamins-Minerals (MULTIVITAMIN ADULTS 50+ OR) Take 1 tablet by mouth daily.      IRON OR Take 1 tablet by mouth daily.      Calcium Carbonate Antacid (TUMS OR) Take 1 tablet by mouth daily.       Social History     Socioeconomic History    Marital status:    Tobacco Use    Smoking status: Former     Current packs/day: 0.00     Average packs/day: 0.5 packs/day for 3.0 years (1.5 ttl pk-yrs)     Types: Cigarettes     Start date: 1976     Quit date: 1979     Years since quittin.0    Smokeless tobacco: Never    Tobacco comments:     Sporafic age 15 - 19   Vaping Use    Vaping status: Never Used   Substance and Sexual Activity    Alcohol use: No    Drug use: No    Sexual activity: Not Currently   Other Topics Concern    Caffeine Concern Yes     Comment: 6 c. coffee/day    Occupational Exposure No    Sleep Concern No    Stress Concern No    Weight Concern No    Special Diet No     Comment: does try to watch salt, increased fruit/veggie/salad    Exercise Yes     Comment: walks 3-5x/dailystationary bike    Seat Belt Yes    Self-Exams  No   Social History Narrative    Works at [employer], raising grandchildren 16 to 11     Counseling given: Not Answered  Tobacco comments: Sporafic age 15 - 19    Family History   Problem Relation Age of Onset    Hypertension Father          of COPD Dec 1995    Asthma Father     Eye Problems Father         cataract    Alcohol and Other Disorders Associated Mother          1979 Liver Failure    Diabetes Daughter     Psychiatric Daughter     Diabetes Maternal Grandmother          1991 Congestive Heart Failure made worse by diabetes    Heart Attack Maternal Grandmother             Psychiatric Brother     Ear Problems Brother         hearing loss    Breast Cancer Maternal Cousin Female     Cancer Other         GI ca-liver, grandfather, great aunt    Heart Disease Other         CAD, grandmother    Heart Attack Other         grandmother    Hypertension Other         grandmother    Diabetes Other         grandmother, great aunt    Asthma Other         grandmother    Psychiatric Other         grandmother    Depression Other         grandmother    Arthritis Other         fam hx    Eye Problems Other         cataract, grandfather    Diabetes Daughter         Diagnosed 2023, on metformin.  Sugar under 200.     Family Status   Relation Status    Fa (Not Specified)    Mo (Not Specified)    Karuna (Not Specified)    MGMA (Not Specified)    Bro (Not Specified)    Bro (Not Specified)    Mat Cous Fem Alive    Other (Not Specified)    Other (Not Specified)    Other (Not Specified)    Other (Not Specified)    Other (Not Specified)    Other (Not Specified)    Other (Not Specified)    Other (Not Specified)    Other (Not Specified)    Other (Not Specified)    Karuna (Not Specified)        REVIEW OF SYSTEMS:   See HPI    EXAM:   /78   Pulse 82   Temp 97.8 °F (36.6 °C) (Temporal)   Resp 16   Ht 4' 10\" (1.473 m)   Wt 151 lb (68.5 kg)   BMI 31.56 kg/m²  Estimated body mass index is 31.56 kg/m² as  calculated from the following:    Height as of this encounter: 4' 10\" (1.473 m).    Weight as of this encounter: 151 lb (68.5 kg).   Vital signs reviewed. Appears stated age, well groomed.  Physical Exam:  GEN:  Patient is alert and oriented x3, no apparent distress  HEAD:  Normocephalic, atraumatic  HEENT:  no scleral icterus, conjunctivae clear bilaterally, EOMI, PERRLA, OP clear  LUNGS: clear to auscultation bilaterally, no rales/rhonchi/wheezing  HEART:  Regular rate and rhythm, normal S1/S2, systolic ejection murmur heard best at aortic area, rubs or gallops  EXTREMITIES:  Moves all extremities well  NEURO:  CN 2 - 12 grossly intact, gait normal    ASSESSMENT AND PLAN:   1. Essential hypertension, benign  Patient has hx of HTN  - will check echo  - will continue current regimen  - CARD ECHO 2D DOPPLER (CPT=93306); Future    2. Mixed hyperlipidemia  Patient presents for follow up of HLD  - no side effects of medications  - will continue current regimen  - low fat diet and exercise  - follow up in 6mo or sooner if needed    3. Hypokalemia  Will start potassium due to recent colitis flare. Will repeat CMP.  - Comp Metabolic Panel (14) [E]; Future  - Potassium Chloride ER 20 MEQ Oral Tab CR; Take 20 mEq by mouth daily.  Dispense: 30 tablet; Refill: 0    4. Need for vaccination  Flu shot and pneumonia vaccine toda  - Fluzone trivalent vaccine, PF 0.5mL, 6mo+ (69541)  - PCV20 (Prevnar 20)    5. Murmur, cardiac  Patient has murmur, will check echo  - CARD ECHO 2D DOPPLER (CPT=93306); Future        Meds & Refills for this Visit:  Requested Prescriptions     Signed Prescriptions Disp Refills    Potassium Chloride ER 20 MEQ Oral Tab CR 30 tablet 0     Sig: Take 20 mEq by mouth daily.       Start Taking               Potassium Chloride ER 20 MEQ Oral Tab CR Take 20 mEq by mouth daily.          Stop Taking                cholestyramine light 4 g Oral Powd Pack    Take 1 packet (4 g total) by mouth daily.            Health  Maintenance:  Health Maintenance Due   Topic Date Due    Pneumococcal Vaccine: Birth to 64yrs (1 of 2 - PCV) Never done    Zoster Vaccines (1 of 2) Never done    HTN: BP Follow-Up  09/02/2024    Pap Smear  09/20/2024    Influenza Vaccine (1) 10/01/2024    Mammogram  11/08/2024       Patient/Caregiver Education: There are no barriers to learning. Medical education done.   Outcome: Patient verbalizes understanding. Patient is notified to call with any questions, complications, allergies, or worsening or changing symptoms.  Patient is to call with any side effects or complications from the treatments as a result of today.     Problem List:  Patient Active Problem List   Diagnosis    Essential hypertension, benign    Generalized osteoarthrosis, involving multiple sites    RUQ abdominal pain    Degeneration of intervertebral disc at L5-S1 level    Chronic left-sided low back pain with left-sided sciatica    Primary osteoarthritis of both knees    Age-related osteoporosis without current pathological fracture    Hypokalemia    Diarrhea, unspecified    Abdominal pain, right upper quadrant    Anemia, unspecified    Agatston coronary artery calcium score less than 100    Collagenous colitis       Return in about 6 months (around 4/24/2025) for annual physical, med check, or sooner if needed.    Zulema Carbajal MD  [facility]  10/24/24      Please note that portions of this note may have been completed with a voice recognition program. Efforts were made to edit the dictations but occasionally words are mis-transcribed. Thank you for your understanding.           [1]   Allergies  Allergen Reactions    Rosuvastatin DIARRHEA

## 2024-10-26 RX ORDER — HYDROCHLOROTHIAZIDE 12.5 MG/1
12.5 CAPSULE ORAL DAILY
Qty: 90 CAPSULE | Refills: 0 | OUTPATIENT
Start: 2024-10-26

## 2024-11-12 ENCOUNTER — HOSPITAL ENCOUNTER (OUTPATIENT)
Dept: MAMMOGRAPHY | Age: 64
Discharge: HOME | End: 2024-11-12
Attending: STUDENT IN AN ORGANIZED HEALTH CARE EDUCATION/TRAINING PROGRAM
Payer: COMMERCIAL

## 2024-11-12 DIAGNOSIS — Z12.31 ENCOUNTER FOR SCREENING MAMMOGRAM FOR MALIGNANT NEOPLASM OF BREAST: ICD-10-CM

## 2024-11-12 PROCEDURE — 77067 SCR MAMMO BI INCL CAD: CPT | Performed by: STUDENT IN AN ORGANIZED HEALTH CARE EDUCATION/TRAINING PROGRAM

## 2024-11-12 PROCEDURE — 77063 BREAST TOMOSYNTHESIS BI: CPT | Performed by: STUDENT IN AN ORGANIZED HEALTH CARE EDUCATION/TRAINING PROGRAM

## 2024-11-21 DIAGNOSIS — E78.2 MIXED HYPERLIPIDEMIA: ICD-10-CM

## 2024-11-21 DIAGNOSIS — I10 ESSENTIAL HYPERTENSION: ICD-10-CM

## 2024-11-22 DIAGNOSIS — E87.6 HYPOKALEMIA: ICD-10-CM

## 2024-11-22 RX ORDER — EZETIMIBE 10 MG/1
10 TABLET ORAL NIGHTLY
Qty: 90 TABLET | Refills: 1 | Status: SHIPPED | OUTPATIENT
Start: 2024-11-22

## 2024-11-22 RX ORDER — IRBESARTAN 300 MG/1
300 TABLET ORAL NIGHTLY
Qty: 90 TABLET | Refills: 1 | Status: SHIPPED | OUTPATIENT
Start: 2024-11-22

## 2024-11-22 NOTE — TELEPHONE ENCOUNTER
Last Office Visit: 10/24/24  Last Refill: 3/7/24  Return to Clinic: 6 months   Protocol: passed- ezetimibe   NOV: n/a   Requested Prescriptions     Pending Prescriptions Disp Refills    IRBESARTAN 300 MG Oral Tab [Pharmacy Med Name: IRBESARTAN 300MG TABLETS] 90 tablet 1     Sig: TAKE 1 TABLET(300 MG) BY MOUTH EVERY NIGHT    EZETIMIBE 10 MG Oral Tab [Pharmacy Med Name: EZETIMIBE 10MG TABLETS] 90 tablet 1     Sig: TAKE 1 TABLET(10 MG) BY MOUTH EVERY NIGHT         Please approve if appropriate.     Thank you!

## 2024-11-23 ENCOUNTER — LAB ENCOUNTER (OUTPATIENT)
Dept: LAB | Facility: HOSPITAL | Age: 64
End: 2024-11-23
Attending: STUDENT IN AN ORGANIZED HEALTH CARE EDUCATION/TRAINING PROGRAM
Payer: COMMERCIAL

## 2024-11-23 DIAGNOSIS — E87.6 HYPOKALEMIA: ICD-10-CM

## 2024-11-23 DIAGNOSIS — D50.8 OTHER IRON DEFICIENCY ANEMIA: ICD-10-CM

## 2024-11-23 LAB
ALBUMIN SERPL-MCNC: 4.4 G/DL (ref 3.2–4.8)
ALBUMIN/GLOB SERPL: 1.8 {RATIO} (ref 1–2)
ALP LIVER SERPL-CCNC: 84 U/L
ALT SERPL-CCNC: 19 U/L
ANION GAP SERPL CALC-SCNC: 3 MMOL/L (ref 0–18)
AST SERPL-CCNC: 19 U/L (ref ?–34)
BASOPHILS # BLD AUTO: 0.07 X10(3) UL (ref 0–0.2)
BASOPHILS NFR BLD AUTO: 1.7 %
BILIRUB SERPL-MCNC: 0.6 MG/DL (ref 0.2–1.1)
BUN BLD-MCNC: 11 MG/DL (ref 9–23)
CALCIUM BLD-MCNC: 9.8 MG/DL (ref 8.7–10.4)
CHLORIDE SERPL-SCNC: 107 MMOL/L (ref 98–112)
CO2 SERPL-SCNC: 30 MMOL/L (ref 21–32)
CREAT BLD-MCNC: 0.76 MG/DL
DEPRECATED HBV CORE AB SER IA-ACNC: 25 NG/ML
EGFRCR SERPLBLD CKD-EPI 2021: 87 ML/MIN/1.73M2 (ref 60–?)
EOSINOPHIL # BLD AUTO: 0.1 X10(3) UL (ref 0–0.7)
EOSINOPHIL NFR BLD AUTO: 2.4 %
ERYTHROCYTE [DISTWIDTH] IN BLOOD BY AUTOMATED COUNT: 13.2 %
FASTING STATUS PATIENT QL REPORTED: YES
GLOBULIN PLAS-MCNC: 2.4 G/DL (ref 2–3.5)
GLUCOSE BLD-MCNC: 78 MG/DL (ref 70–99)
HCT VFR BLD AUTO: 37.9 %
HGB BLD-MCNC: 12.7 G/DL
IMM GRANULOCYTES # BLD AUTO: 0.01 X10(3) UL (ref 0–1)
IMM GRANULOCYTES NFR BLD: 0.2 %
IRON SATN MFR SERPL: 31 %
IRON SERPL-MCNC: 90 UG/DL
LYMPHOCYTES # BLD AUTO: 1.12 X10(3) UL (ref 1–4)
LYMPHOCYTES NFR BLD AUTO: 27.3 %
MCH RBC QN AUTO: 30.2 PG (ref 26–34)
MCHC RBC AUTO-ENTMCNC: 33.5 G/DL (ref 31–37)
MCV RBC AUTO: 90.2 FL
MONOCYTES # BLD AUTO: 0.38 X10(3) UL (ref 0.1–1)
MONOCYTES NFR BLD AUTO: 9.3 %
NEUTROPHILS # BLD AUTO: 2.42 X10 (3) UL (ref 1.5–7.7)
NEUTROPHILS # BLD AUTO: 2.42 X10(3) UL (ref 1.5–7.7)
NEUTROPHILS NFR BLD AUTO: 59.1 %
OSMOLALITY SERPL CALC.SUM OF ELEC: 288 MOSM/KG (ref 275–295)
PLATELET # BLD AUTO: 265 10(3)UL (ref 150–450)
POTASSIUM SERPL-SCNC: 3.7 MMOL/L (ref 3.5–5.1)
PROT SERPL-MCNC: 6.8 G/DL (ref 5.7–8.2)
RBC # BLD AUTO: 4.2 X10(6)UL
SODIUM SERPL-SCNC: 140 MMOL/L (ref 136–145)
TOTAL IRON BINDING CAPACITY: 290 UG/DL (ref 250–425)
TRANSFERRIN SERPL-MCNC: 227 MG/DL (ref 250–380)
WBC # BLD AUTO: 4.1 X10(3) UL (ref 4–11)

## 2024-11-23 PROCEDURE — 82728 ASSAY OF FERRITIN: CPT

## 2024-11-23 PROCEDURE — 80053 COMPREHEN METABOLIC PANEL: CPT

## 2024-11-23 PROCEDURE — 83550 IRON BINDING TEST: CPT

## 2024-11-23 PROCEDURE — 83540 ASSAY OF IRON: CPT

## 2024-11-23 PROCEDURE — 36415 COLL VENOUS BLD VENIPUNCTURE: CPT

## 2024-11-23 PROCEDURE — 85025 COMPLETE CBC W/AUTO DIFF WBC: CPT

## 2024-11-25 RX ORDER — POTASSIUM CHLORIDE 1500 MG/1
1 TABLET, EXTENDED RELEASE ORAL DAILY
Qty: 30 TABLET | Refills: 0 | Status: SHIPPED | OUTPATIENT
Start: 2024-11-25

## 2024-11-25 NOTE — TELEPHONE ENCOUNTER
Did not pass protocol  Last office visit 10/24/2024  Last refill was: ,10/24/2024  30 __tabs  Next appointment: none scheduled    Please sign pended medication if appropriate         Medication Quantity Refills Start End   Potassium Chloride ER 20 MEQ Oral Tab CR () 30 tablet 0 10/24/2024 2024   Sig:   Take 20 mEq by mouth daily.

## 2024-11-27 ENCOUNTER — PATIENT MESSAGE (OUTPATIENT)
Dept: FAMILY MEDICINE CLINIC | Facility: CLINIC | Age: 64
End: 2024-11-27

## 2024-11-30 ENCOUNTER — HOSPITAL ENCOUNTER (OUTPATIENT)
Dept: CV DIAGNOSTICS | Facility: HOSPITAL | Age: 64
Discharge: HOME | End: 2024-11-30
Attending: STUDENT IN AN ORGANIZED HEALTH CARE EDUCATION/TRAINING PROGRAM
Payer: COMMERCIAL

## 2024-11-30 DIAGNOSIS — R01.1 MURMUR, CARDIAC: ICD-10-CM

## 2024-11-30 DIAGNOSIS — I10 ESSENTIAL HYPERTENSION, BENIGN: ICD-10-CM

## 2024-11-30 PROCEDURE — 93306 TTE W/DOPPLER COMPLETE: CPT | Performed by: STUDENT IN AN ORGANIZED HEALTH CARE EDUCATION/TRAINING PROGRAM

## 2024-12-02 DIAGNOSIS — I34.0 MITRAL VALVE INSUFFICIENCY, UNSPECIFIED ETIOLOGY: ICD-10-CM

## 2024-12-02 DIAGNOSIS — R01.1 MURMUR, CARDIAC: Primary | ICD-10-CM

## 2024-12-02 DIAGNOSIS — I51.89 GRADE I DIASTOLIC DYSFUNCTION: ICD-10-CM

## 2024-12-02 DIAGNOSIS — R93.1 ABNORMAL ECHOCARDIOGRAM: ICD-10-CM

## 2024-12-02 DIAGNOSIS — I51.7 LEFT ATRIAL ENLARGEMENT: ICD-10-CM

## 2024-12-26 DIAGNOSIS — E87.6 HYPOKALEMIA: ICD-10-CM

## 2024-12-26 NOTE — TELEPHONE ENCOUNTER
Last Office Visit: 04/01/2024    Last Refill:   Medication Quantity Refills Start End   Potassium Chloride ER 20 MEQ Oral Tab CR 30 tablet 0 11/25/2024      Return to Clinic:6 month  No follow up scheduled  Protocol: Failed    Please call patient to schedule medication check then route to clinical staff

## 2024-12-27 RX ORDER — POTASSIUM CHLORIDE 1500 MG/1
1 TABLET, EXTENDED RELEASE ORAL DAILY
Qty: 30 TABLET | Refills: 0 | Status: SHIPPED | OUTPATIENT
Start: 2024-12-27

## 2025-01-05 DIAGNOSIS — I10 ESSENTIAL HYPERTENSION, BENIGN: ICD-10-CM

## 2025-01-07 RX ORDER — HYDROCHLOROTHIAZIDE 12.5 MG/1
12.5 CAPSULE ORAL DAILY
Qty: 90 CAPSULE | Refills: 0 | Status: SHIPPED | OUTPATIENT
Start: 2025-01-07

## 2025-02-03 DIAGNOSIS — E87.6 HYPOKALEMIA: ICD-10-CM

## 2025-02-05 RX ORDER — POTASSIUM CHLORIDE 1500 MG/1
1 TABLET, EXTENDED RELEASE ORAL DAILY
Qty: 90 TABLET | Refills: 0 | Status: SHIPPED | OUTPATIENT
Start: 2025-02-05

## 2025-02-05 NOTE — TELEPHONE ENCOUNTER
Last office visit: 10/24/24  Next office visit: 2/13/25  Last Refill: 12/27/24    Requested Prescriptions     Pending Prescriptions Disp Refills    POTASSIUM CHLORIDE ER 20 MEQ Oral Tab CR [Pharmacy Med Name: POTASSIUM CHLORIDE 20MEQ ER TABLETS] 30 tablet 0     Sig: TAKE 1 TABLET BY MOUTH DAILY       Please authorize if acceptable.   Thank you!

## 2025-02-13 ENCOUNTER — OFFICE VISIT (OUTPATIENT)
Dept: FAMILY MEDICINE CLINIC | Facility: CLINIC | Age: 65
End: 2025-02-13
Payer: COMMERCIAL

## 2025-02-13 VITALS
WEIGHT: 154 LBS | DIASTOLIC BLOOD PRESSURE: 80 MMHG | HEART RATE: 80 BPM | TEMPERATURE: 98 F | RESPIRATION RATE: 16 BRPM | SYSTOLIC BLOOD PRESSURE: 136 MMHG | BODY MASS INDEX: 31.46 KG/M2 | HEIGHT: 58.5 IN

## 2025-02-13 DIAGNOSIS — E78.2 MIXED HYPERLIPIDEMIA: ICD-10-CM

## 2025-02-13 DIAGNOSIS — I10 ESSENTIAL HYPERTENSION, BENIGN: Primary | ICD-10-CM

## 2025-02-13 DIAGNOSIS — T50.2X5A DIURETIC-INDUCED HYPOKALEMIA: ICD-10-CM

## 2025-02-13 DIAGNOSIS — E87.6 DIURETIC-INDUCED HYPOKALEMIA: ICD-10-CM

## 2025-02-13 DIAGNOSIS — Z00.00 LABORATORY EXAM ORDERED AS PART OF ROUTINE GENERAL MEDICAL EXAMINATION: ICD-10-CM

## 2025-02-13 PROCEDURE — 99214 OFFICE O/P EST MOD 30 MIN: CPT | Performed by: STUDENT IN AN ORGANIZED HEALTH CARE EDUCATION/TRAINING PROGRAM

## 2025-02-13 RX ORDER — HYDROCHLOROTHIAZIDE 12.5 MG/1
12.5 CAPSULE ORAL DAILY
Qty: 90 CAPSULE | Refills: 1 | Status: SHIPPED | OUTPATIENT
Start: 2025-02-13

## 2025-02-13 RX ORDER — IRBESARTAN 300 MG/1
300 TABLET ORAL NIGHTLY
Qty: 90 TABLET | Refills: 1 | Status: SHIPPED | OUTPATIENT
Start: 2025-02-13

## 2025-02-13 RX ORDER — EZETIMIBE 10 MG/1
10 TABLET ORAL NIGHTLY
Qty: 90 TABLET | Refills: 1 | Status: SHIPPED | OUTPATIENT
Start: 2025-02-13

## 2025-02-13 NOTE — PROGRESS NOTES
[facility] Note  02/13/25    Chief Complaint   Patient presents with    Medication Follow-Up     HPI:   Muriel Reeder is a 64 year old female who presents for follow up.    Patient presents for recheck of her hypertension. Pt has been taking medications as instructed, no medication side effects.   BP Meds: hydroCHLOROthiazide Caps - 12.5 MG; Irbesartan Tabs - 300 MG   Last Cr was 0.76 done on 11/23/2024.Last eGFR was 87 on 11/23/2024.    Patient presents for follow up of hyperlipidemia. Patient has been taking medications as prescribed, no muscle aches noted. Working on diet and exercise.   Cholesterol Meds: colestipol Tabs - 1 g; ezetimibe Tabs - 10 MG   Cholesterol: 181, done on 9/28/2024.  HDL Cholesterol: 72, done on 9/28/2024.  LDL Cholesterol: 96, done on 9/28/2024.  TriGlycerides 67, done on 9/28/2024.    Last ALT was 19 done on 11/23/2024.  Last AST was 19 done on 11/23/2024.    Yesterday had a bad day with the GI symptoms. Taking budesonide right now.    Saw cardiology recently and has 1 year follow up for next year.    Patient has concerns about her granddaughter's lifestyle choices, eating out and not working anymore.    Wt Readings from Last 6 Encounters:   02/13/25 154 lb (69.9 kg)   01/02/25 151 lb 3.2 oz (68.6 kg)   11/05/24 151 lb 6.4 oz (68.7 kg)   10/24/24 151 lb (68.5 kg)   08/02/24 149 lb (67.6 kg)   04/01/24 153 lb 12.8 oz (69.8 kg)     Past Medical History:    Abdominal pain    Chronic RUQ pain off and on, see mycjosuet for gallbladder tests    Actinic keratosis    Allergic rhinitis, cause unspecified    Anemia, unspecified    Arthritis    Back pain    Bad breath    Belching    Blurred vision    Can only read 30 minutes or so.  Night vision declining.    Cancer of skin, squamous cell    removed off of left foot    Chest pain on exertion    Very mild, not a real concern    Diarrhea, unspecified    Sudden urgency 5 or so times per day    Disorder of bone and  cartilage, unspecified    Edema    Essential hypertension, benign    Fatigue    Female stress incontinence    Flatulence/gas pain/belching    Worse when I eat onions, cabbage, brocolli, cauliflower    Generalized osteoarthrosis, involving multiple sites    Gestational diabetes (HCC)    Headache disorder    High cholesterol    Mildly elevated for several years but favorable ratio of good cholesterol    Itch of skin    Leukopenia    since at least     Menopause    age 45    Osteopenia    Osteoporosis    Other neutropenia    Pain in joint, lower leg    Pain in joints    Pain with bowel movements    Urgency, tenderness in abdomen during episodes    Positive KENY (antinuclear antibody)    Pyelonephritis    Stool incontinence    Only if I can't run fast enough to the facilities when urgency hits    Unspecified essential hypertension    diagnosis 2006    Wears glasses     Past Surgical History:   Procedure Laterality Date          x2    Colonoscopy      Oral surgery procedure  2009    gum tissue graft from roof of mouth to bottom front gums    Skin surgery Left 2021    left thumb squamous cell cancer removed    Skin surgery Left 2021    left hand squamous cell cancer removed    Skin surgery Left 2018    left hand squamous cell cancer removed     Allergies[1]   hydroCHLOROthiazide 12.5 MG Oral Cap Take 1 capsule (12.5 mg total) by mouth daily. 90 capsule 1    ezetimibe 10 MG Oral Tab Take 1 tablet (10 mg total) by mouth nightly. 90 tablet 1    Irbesartan 300 MG Oral Tab Take 1 tablet (300 mg total) by mouth nightly. 90 tablet 1    Potassium Chloride ER 20 MEQ Oral Tab CR Take 1 tablet by mouth daily. 90 tablet 0    COLESTIPOL 1 g Oral Tab TAKE 2 TABLETS(2 GRAMS) BY MOUTH TWICE DAILY 120 tablet 1    Melatonin 10 MG Oral Tab Take 1 tablet by mouth daily.      Multiple Vitamins-Minerals (MULTIVITAMIN ADULTS 50+ OR) Take 1 tablet by mouth daily.      IRON OR Take 1 tablet by mouth daily.      Calcium  Carbonate Antacid (TUMS OR) Take 1 tablet by mouth daily.       Social History     Socioeconomic History    Marital status:    Tobacco Use    Smoking status: Former     Current packs/day: 0.00     Average packs/day: 0.5 packs/day for 3.0 years (1.5 ttl pk-yrs)     Types: Cigarettes     Start date: 1976     Quit date: 1979     Years since quittin.3    Smokeless tobacco: Never    Tobacco comments:     Sporafic age 15 - 19   Vaping Use    Vaping status: Never Used   Substance and Sexual Activity    Alcohol use: No    Drug use: No    Sexual activity: Not Currently   Other Topics Concern    Caffeine Concern Yes     Comment: 6 c. coffee/day    Occupational Exposure No    Sleep Concern No    Stress Concern No    Weight Concern No    Special Diet No     Comment: does try to watch salt, increased fruit/veggie/salad    Exercise Yes     Comment: walks 3-5x/dailystationary bike    Seat Belt Yes    Self-Exams No   Social History Narrative    Works at [employer], raising grandchildren 16 to 11     Counseling given: Not Answered  Tobacco comments: Kellyafic age 15 - 19    Family History   Problem Relation Age of Onset    Alcohol and Other Disorders Associated Mother          1979 Liver Failure    Hypertension Father          of COPD Dec 1995    Asthma Father     Eye Problems Father         cataract    Psychiatric Brother     Ear Problems Brother         hearing loss    Diabetes Daughter     Psychiatric Daughter     Diabetes Daughter         Diagnosed 2023, on metformin.  Sugar under 200.    Diabetes Maternal Grandmother          1991 Congestive Heart Failure made worse by diabetes    Heart Attack Maternal Grandmother             Breast Cancer Maternal Cousin Female 55    Cancer Other         GI ca-liver, grandfather, great aunt    Heart Disease Other         CAD, grandmother    Heart Attack Other         grandmother    Hypertension Other         grandmother    Diabetes Other          grandmother, great aunt    Asthma Other         grandmother    Psychiatric Other         grandmother    Depression Other         grandmother    Arthritis Other         fam hx    Eye Problems Other         cataract, grandfather     Family Status   Relation Status    Mo (Not Specified)    Fa (Not Specified)    Bro (Not Specified)    Bro (Not Specified)    Karuna (Not Specified)    Karuna (Not Specified)    MGMA (Not Specified)    Mat Cous Fem Alive    Other (Not Specified)    Other (Not Specified)    Other (Not Specified)    Other (Not Specified)    Other (Not Specified)    Other (Not Specified)    Other (Not Specified)    Other (Not Specified)    Other (Not Specified)    Other (Not Specified)        REVIEW OF SYSTEMS:   See HPI    EXAM:   /80   Pulse 80   Temp 97.5 °F (36.4 °C) (Temporal)   Resp 16   Ht 4' 10.5\" (1.486 m)   Wt 154 lb (69.9 kg)   BMI 31.64 kg/m²  Estimated body mass index is 31.64 kg/m² as calculated from the following:    Height as of this encounter: 4' 10.5\" (1.486 m).    Weight as of this encounter: 154 lb (69.9 kg).   Vital signs reviewed. Appears stated age, well groomed.  Physical Exam:  GEN:  Patient is alert and oriented x3, no apparent distress  HEAD:  Normocephalic, atraumatic  HEENT:  no scleral icterus, conjunctivae clear bilaterally, EOMI, PERRLA, OP clear  LUNGS: clear to auscultation bilaterally, no rales/rhonchi/wheezing  HEART:  Regular rate and rhythm, normal S1/S2, no murmurs, rubs or gallops  EXTREMITIES:  Moves all extremities well  NEURO:  CN 2 - 12 grossly intact, gait normal, patellar reflexes 2+ and equal b/l      ASSESSMENT AND PLAN:   1. Essential hypertension, benign  Pt presents for follow up of HTN  - no red flag symptoms  - BP controlled  - continue current regimen  - RTC 6mo or sooner if needed  - hydroCHLOROthiazide 12.5 MG Oral Cap; Take 1 capsule (12.5 mg total) by mouth daily.  Dispense: 90 capsule; Refill: 1  - Irbesartan 300 MG Oral Tab; Take 1 tablet  (300 mg total) by mouth nightly.  Dispense: 90 tablet; Refill: 1  - Comp Metabolic Panel (14) [E]; Future    2. Mixed hyperlipidemia  Patient presents for follow up of HLD  - no side effects of medications  - will continue current regimen  - low fat diet and exercise  - follow up in 6mo or sooner if needed  - ezetimibe 10 MG Oral Tab; Take 1 tablet (10 mg total) by mouth nightly.  Dispense: 90 tablet; Refill: 1  - Lipid Panel [E]; Future  - Comp Metabolic Panel (14) [E]; Future    3. Diuretic-induced hypokalemia  Due to hydrochlorothiazide. Continue potassium chloride ER 20 mEq daily        Meds & Refills for this Visit:  Requested Prescriptions     Signed Prescriptions Disp Refills    hydroCHLOROthiazide 12.5 MG Oral Cap 90 capsule 1     Sig: Take 1 capsule (12.5 mg total) by mouth daily.    ezetimibe 10 MG Oral Tab 90 tablet 1     Sig: Take 1 tablet (10 mg total) by mouth nightly.    Irbesartan 300 MG Oral Tab 90 tablet 1     Sig: Take 1 tablet (300 mg total) by mouth nightly.       These Medications Have Changed       Start Taking Instead of    ezetimibe 10 MG Oral Tab EZETIMIBE 10 MG Oral Tab    Dosage:  Take 1 tablet (10 mg total) by mouth nightly. - Oral Dosage:  TAKE 1 TABLET(10 MG) BY MOUTH EVERY NIGHT - Oral    Irbesartan 300 MG Oral Tab IRBESARTAN 300 MG Oral Tab    Dosage:  Take 1 tablet (300 mg total) by mouth nightly. - Oral Dosage:  TAKE 1 TABLET(300 MG) BY MOUTH EVERY NIGHT - Oral            Health Maintenance:  Health Maintenance Due   Topic Date Due    Zoster Vaccines (1 of 2) Never done    COVID-19 Vaccine (8 - 2024-25 season) 09/01/2024    Pap Smear  09/20/2024    Annual Depression Screening  01/01/2025    HTN: BP Follow-Up  02/02/2025    Annual Physical  03/07/2025       Patient/Caregiver Education: There are no barriers to learning. Medical education done.   Outcome: Patient verbalizes understanding. Patient is notified to call with any questions, complications, allergies, or worsening or  changing symptoms.  Patient is to call with any side effects or complications from the treatments as a result of today.     Problem List:  Patient Active Problem List   Diagnosis    Essential hypertension, benign    Generalized osteoarthrosis, involving multiple sites    RUQ abdominal pain    Degeneration of intervertebral disc at L5-S1 level    Chronic left-sided low back pain with left-sided sciatica    Primary osteoarthritis of both knees    Age-related osteoporosis without current pathological fracture    Hypokalemia    Diarrhea, unspecified    Abdominal pain, right upper quadrant    Anemia, unspecified    Agatston coronary artery calcium score less than 100    Collagenous colitis       Return in about 6 months (around 8/13/2025) for annual physical, med check, or sooner if needed.      Zulema Carbajal MD  [facility]  02/13/25      Please note that portions of this note may have been completed with a voice recognition program. Efforts were made to edit the dictations but occasionally words are mis-transcribed. Thank you for your understanding.    The 21st Century Cures Act makes medical notes like these available to patients in the interest of transparency. Please be advised this is a medical document. Medical documents are intended to carry relevant information, facts as evident, and the clinical opinion of the practitioner. The medical note is intended as peer to peer communication and may appear blunt or direct. It is written in medical language and may contain abbreviations or verbiage that are unfamiliar. If there are any questions or concerns please contact the provider for clarification.              [1]   Allergies  Allergen Reactions    Rosuvastatin DIARRHEA

## 2025-02-13 NOTE — PATIENT INSTRUCTIONS
Refill policies:      Allow 3 business days for refills; controlled substances may take longer.  Contact your pharmacy at least 5-7 business days prior to running out of medication and have them send an electronic request or submit through the \"request refill\" option thru your [employer] account. No need to do both, as multiple requests will create an automated [employer] message to notify of a denial for one of the duplicated requests, causing you undue confusion.   Refills are NOT addressed on weekends; covering physicians do not authorize routine medications on weekends.  No narcotics or controlled substances are refilled after noon on Fridays or by on call physicians.  By law, narcotics cannot be faxed or phoned into your pharmacy.  If your prescription is due for a refill, you may be due for a follow up appointment. Please call our office at [phone] to make an appointment or schedule an appointment via [employer].  To best provide you care, patients receiving routine medications need to be seen at least twice a year. Patients receiving narcotic/controlled substance medications need to be seen at least once every 3 months.  In the event that your preferred pharmacy does not have the requested medication in stock (ie Backordered), it is your responsibility to find another pharmacy that has the requested medication available. We will gladly send a new prescription to that pharmacy at your request.  controlled substances may not be able to be filled out of state due to license restrictions.  If you have a planned trip, it's best to call your pharmacy at least 5-7 business days to prevent any delays in your medication refill.    Scheduling Tests:    If your physician has ordered radiology tests such as MRI or CT scans, please contact Central Scheduling at [phone] right away to schedule the test.  Once scheduled, the [facility] Centralized Referral Team will work with your insurance carrier to obtain pre-certification or  prior authorization.  Depending on your insurance carrier, approval may take 3-10 days.  It is highly recommended patients assure they have received an authorization before having a test performed.  If test is done without insurance authorization, patient may be responsible for the entire amount billed.      Precertification and Prior Authorizations:  If your physician has recommended that you have a procedure or additional testing performed the [facility] Centralized Referral Team will contact your insurance carrier to obtain pre-certification or prior authorization.    You are strongly encouraged to contact your insurance carrier to verify that your procedure/test has been approved and is a COVERED benefit.  Although the [facility] Centralized Referral Team does its due diligence, the insurance carrier gives the disclaimer that \"Although the procedure is authorized, this does not guarantee payment.\"    Ultimately the patient is responsible for payment.   Thank you for your understanding in this matter.  Paperwork Completion:  If you require FMLA or disability paperwork for your recovery, please make sure to either drop it off or have it faxed to our office at [phone]. Be sure the form has your name and date of birth on it.  The form will be faxed to our Forms Department and they will complete it for you.  There is a 25$ fee for all forms that need to be filled out.  Please be aware there is a 10-14 day turnaround time.  You will need to sign a release of information (FRANTZ) form if your paperwork does not come with one.  You may call the Forms Department with any questions at [phone].  Their fax number is [phone].

## 2025-04-04 DIAGNOSIS — I10 ESSENTIAL HYPERTENSION, BENIGN: ICD-10-CM

## 2025-04-04 RX ORDER — HYDROCHLOROTHIAZIDE 12.5 MG/1
12.5 CAPSULE ORAL DAILY
Qty: 90 CAPSULE | Refills: 0 | OUTPATIENT
Start: 2025-04-04

## 2025-04-04 NOTE — TELEPHONE ENCOUNTER
Last Office Visit: 02/13/2025    Last Refill:   Medication Quantity Refills Start End   hydroCHLOROthiazide 12.5 MG Oral Cap 90 capsule 1 2/13/2025 --     Return to Clinic: 08/13/2025    Protocol:     Medication sent on 02/13/2025 for 90 days and 1 refill

## 2025-05-01 DIAGNOSIS — E87.6 HYPOKALEMIA: ICD-10-CM

## 2025-05-01 RX ORDER — POTASSIUM CHLORIDE 1500 MG/1
1 TABLET, EXTENDED RELEASE ORAL DAILY
Qty: 90 TABLET | Refills: 1 | Status: SHIPPED | OUTPATIENT
Start: 2025-05-01

## 2025-05-01 NOTE — TELEPHONE ENCOUNTER
Last Office Visit: 02/13/2025    Last Refill:   Medication Quantity Refills Start End   Potassium Chloride ER 20 MEQ Oral Tab CR 90 tablet 0 2/5/2025 --     Return to Clinic: 08/13/2025    Protocol: n/a    Refill pended. Please approve if okay. Thank you.

## 2025-05-28 DIAGNOSIS — E78.2 MIXED HYPERLIPIDEMIA: ICD-10-CM

## 2025-05-28 DIAGNOSIS — I10 ESSENTIAL HYPERTENSION, BENIGN: ICD-10-CM

## 2025-05-29 RX ORDER — EZETIMIBE 10 MG/1
10 TABLET ORAL NIGHTLY
Qty: 90 TABLET | Refills: 1 | OUTPATIENT
Start: 2025-05-29

## 2025-05-29 RX ORDER — IRBESARTAN 300 MG/1
300 TABLET ORAL NIGHTLY
Qty: 90 TABLET | Refills: 1 | OUTPATIENT
Start: 2025-05-29

## 2025-05-29 NOTE — TELEPHONE ENCOUNTER
Last Office Visit: 02/13/2025    Last Refill:   Requested Prescriptions     Pending Prescriptions Disp Refills    EZETIMIBE 10 MG Oral Tab [Pharmacy Med Name: EZETIMIBE 10MG TABLETS] 90 tablet 1     Sig: TAKE 1 TABLET(10 MG) BY MOUTH EVERY NIGHT    IRBESARTAN 300 MG Oral Tab [Pharmacy Med Name: IRBESARTAN 300MG TABLETS] 90 tablet 1     Sig: TAKE 1 TABLET(300 MG) BY MOUTH EVERY NIGHT       Return to Clinic: 08/13/2025    Protocol:

## 2025-05-31 DIAGNOSIS — I10 ESSENTIAL HYPERTENSION, BENIGN: ICD-10-CM

## 2025-05-31 DIAGNOSIS — E78.2 MIXED HYPERLIPIDEMIA: ICD-10-CM

## 2025-06-02 RX ORDER — IRBESARTAN 300 MG/1
300 TABLET ORAL NIGHTLY
Qty: 90 TABLET | Refills: 1 | OUTPATIENT
Start: 2025-06-02

## 2025-06-02 RX ORDER — EZETIMIBE 10 MG/1
10 TABLET ORAL NIGHTLY
Qty: 90 TABLET | Refills: 1 | OUTPATIENT
Start: 2025-06-02

## 2025-08-09 ENCOUNTER — LAB ENCOUNTER (OUTPATIENT)
Dept: LAB | Facility: HOSPITAL | Age: 65
End: 2025-08-09
Attending: STUDENT IN AN ORGANIZED HEALTH CARE EDUCATION/TRAINING PROGRAM

## 2025-08-09 DIAGNOSIS — I10 ESSENTIAL HYPERTENSION, BENIGN: ICD-10-CM

## 2025-08-09 DIAGNOSIS — E78.2 MIXED HYPERLIPIDEMIA: ICD-10-CM

## 2025-08-09 DIAGNOSIS — Z00.00 LABORATORY EXAM ORDERED AS PART OF ROUTINE GENERAL MEDICAL EXAMINATION: ICD-10-CM

## 2025-08-09 LAB
ALBUMIN SERPL-MCNC: 4.6 G/DL (ref 3.2–4.8)
ALBUMIN/GLOB SERPL: 1.8 (ref 1–2)
ALP LIVER SERPL-CCNC: 69 U/L (ref 50–130)
ALT SERPL-CCNC: 19 U/L (ref 10–49)
ANION GAP SERPL CALC-SCNC: 9 MMOL/L (ref 0–18)
AST SERPL-CCNC: 13 U/L (ref ?–34)
BASOPHILS # BLD AUTO: 0.05 X10(3) UL (ref 0–0.2)
BASOPHILS NFR BLD AUTO: 1 %
BILIRUB SERPL-MCNC: 0.7 MG/DL (ref 0.2–1.1)
BILIRUB UR QL STRIP.AUTO: NEGATIVE
BUN BLD-MCNC: 13 MG/DL (ref 9–23)
CALCIUM BLD-MCNC: 9.7 MG/DL (ref 8.7–10.6)
CHLORIDE SERPL-SCNC: 103 MMOL/L (ref 98–112)
CHOLEST SERPL-MCNC: 175 MG/DL (ref ?–200)
CLARITY UR REFRACT.AUTO: CLEAR
CO2 SERPL-SCNC: 29 MMOL/L (ref 21–32)
COLOR UR AUTO: YELLOW
CREAT BLD-MCNC: 0.76 MG/DL (ref 0.55–1.02)
EGFRCR SERPLBLD CKD-EPI 2021: 87 ML/MIN/1.73M2 (ref 60–?)
EOSINOPHIL # BLD AUTO: 0.08 X10(3) UL (ref 0–0.7)
EOSINOPHIL NFR BLD AUTO: 1.7 %
ERYTHROCYTE [DISTWIDTH] IN BLOOD BY AUTOMATED COUNT: 13 %
FASTING PATIENT LIPID ANSWER: YES
FASTING STATUS PATIENT QL REPORTED: YES
GLOBULIN PLAS-MCNC: 2.5 G/DL (ref 2–3.5)
GLUCOSE BLD-MCNC: 92 MG/DL (ref 70–99)
GLUCOSE UR STRIP.AUTO-MCNC: NORMAL MG/DL
HCT VFR BLD AUTO: 36 % (ref 35–48)
HDLC SERPL-MCNC: 73 MG/DL (ref 40–59)
HGB BLD-MCNC: 12.1 G/DL (ref 12–16)
IMM GRANULOCYTES # BLD AUTO: 0.01 X10(3) UL (ref 0–1)
IMM GRANULOCYTES NFR BLD: 0.2 %
KETONES UR STRIP.AUTO-MCNC: NEGATIVE MG/DL
LDLC SERPL CALC-MCNC: 84 MG/DL (ref ?–100)
LEUKOCYTE ESTERASE UR QL STRIP.AUTO: NEGATIVE
LYMPHOCYTES # BLD AUTO: 1.12 X10(3) UL (ref 1–4)
LYMPHOCYTES NFR BLD AUTO: 23.2 %
MCH RBC QN AUTO: 29.9 PG (ref 26–34)
MCHC RBC AUTO-ENTMCNC: 33.6 G/DL (ref 31–37)
MCV RBC AUTO: 88.9 FL (ref 80–100)
MONOCYTES # BLD AUTO: 0.43 X10(3) UL (ref 0.1–1)
MONOCYTES NFR BLD AUTO: 8.9 %
NEUTROPHILS # BLD AUTO: 3.14 X10 (3) UL (ref 1.5–7.7)
NEUTROPHILS # BLD AUTO: 3.14 X10(3) UL (ref 1.5–7.7)
NEUTROPHILS NFR BLD AUTO: 65 %
NITRITE UR QL STRIP.AUTO: NEGATIVE
NONHDLC SERPL-MCNC: 102 MG/DL (ref ?–130)
OSMOLALITY SERPL CALC.SUM OF ELEC: 292 MOSM/KG (ref 275–295)
PH UR STRIP.AUTO: 6.5 (ref 5–8)
PLATELET # BLD AUTO: 299 10(3)UL (ref 150–450)
POTASSIUM SERPL-SCNC: 3.6 MMOL/L (ref 3.5–5.1)
PROT SERPL-MCNC: 7.1 G/DL (ref 5.7–8.2)
PROT UR STRIP.AUTO-MCNC: NEGATIVE MG/DL
RBC # BLD AUTO: 4.05 X10(6)UL (ref 3.8–5.3)
RBC UR QL AUTO: NEGATIVE
SODIUM SERPL-SCNC: 141 MMOL/L (ref 136–145)
SP GR UR STRIP.AUTO: 1.02 (ref 1–1.03)
TRIGL SERPL-MCNC: 98 MG/DL (ref 30–149)
TSI SER-ACNC: 2.85 UIU/ML (ref 0.55–4.78)
UROBILINOGEN UR STRIP.AUTO-MCNC: NORMAL MG/DL
VLDLC SERPL CALC-MCNC: 16 MG/DL (ref 0–30)
WBC # BLD AUTO: 4.8 X10(3) UL (ref 4–11)

## 2025-08-09 PROCEDURE — 80061 LIPID PANEL: CPT

## 2025-08-09 PROCEDURE — 81003 URINALYSIS AUTO W/O SCOPE: CPT

## 2025-08-09 PROCEDURE — 36415 COLL VENOUS BLD VENIPUNCTURE: CPT

## 2025-08-09 PROCEDURE — 80053 COMPREHEN METABOLIC PANEL: CPT

## 2025-08-09 PROCEDURE — 84443 ASSAY THYROID STIM HORMONE: CPT

## 2025-08-09 PROCEDURE — 85025 COMPLETE CBC W/AUTO DIFF WBC: CPT

## (undated) DIAGNOSIS — E78.2 MIXED HYPERLIPIDEMIA: ICD-10-CM

## (undated) DIAGNOSIS — I10 ESSENTIAL HYPERTENSION: ICD-10-CM

## (undated) DIAGNOSIS — I70.0 THORACIC AORTIC ATHEROSCLEROSIS (HCC): ICD-10-CM

## (undated) NOTE — LETTER
08/09/21        [facility]  [address] [place] [address]      Dear Tito Stroud,    [address] [facility] records indicate that you have outstanding lab work and or testing that was ordered for you and has not yet been completed:  Orders Placed This Encounter      C

## (undated) NOTE — LETTER
03/14/18        Sarina Juares  [address] [facility]      Dear Juan David Hutchison,    [address] [facility] records indicate that you have outstanding lab work and or testing that was ordered for you and has not yet been completed:          Basic Metabolic Panel (8) [

## (undated) NOTE — MR AVS SNAPSHOT
[facility] [address] [place] [id number]  [phone]               Thank you for choosing us for your health care visit with Francisco Grissom MD.  We are glad to serve you and happy to provide you with this Medication Request           Medical Issues Discussed Today     Essential hypertension    -  Primary    Weight gain        Screening for thyroid disorder        Leukopenia, unspecified type        Screening for deficiency anemia        Screening, lipid MyChart questions? Call [phone] for help. [facility] is NOT to be used for urgent needs. For medical emergencies, dial 911.         Educational Information     Healthy Diet and Regular Exercise  [employer] [facility] [address] [place] [address] Smart Voicemail